# Patient Record
Sex: MALE | Race: WHITE | Employment: FULL TIME | URBAN - METROPOLITAN AREA
[De-identification: names, ages, dates, MRNs, and addresses within clinical notes are randomized per-mention and may not be internally consistent; named-entity substitution may affect disease eponyms.]

---

## 2018-07-18 ENCOUNTER — HOSPITAL ENCOUNTER (OUTPATIENT)
Dept: ULTRASOUND IMAGING | Age: 23
Discharge: HOME | End: 2018-07-18
Attending: FAMILY MEDICINE
Payer: SELF-PAY

## 2018-07-18 DIAGNOSIS — N13.70 VESICOURETERAL REFLUX: ICD-10-CM

## 2018-07-18 PROCEDURE — 76770 US EXAM ABDO BACK WALL COMP: CPT

## 2018-09-12 ENCOUNTER — OFFICE VISIT (OUTPATIENT)
Dept: SURGERY | Age: 23
End: 2018-09-12

## 2018-09-12 VITALS
HEIGHT: 71 IN | WEIGHT: 159 LBS | BODY MASS INDEX: 22.26 KG/M2 | TEMPERATURE: 97.6 F | DIASTOLIC BLOOD PRESSURE: 75 MMHG | HEART RATE: 66 BPM | OXYGEN SATURATION: 98 % | SYSTOLIC BLOOD PRESSURE: 115 MMHG

## 2018-09-12 DIAGNOSIS — R10.84 GENERALIZED ABDOMINAL PAIN: Primary | ICD-10-CM

## 2018-09-12 DIAGNOSIS — K40.90 LEFT INGUINAL HERNIA: ICD-10-CM

## 2018-09-12 DIAGNOSIS — R10.32 LEFT GROIN PAIN: ICD-10-CM

## 2018-09-12 NOTE — PROGRESS NOTES
Patient presents today for referral for hernia that has descended into the scrotum. 1. Have you been to the ER, urgent care clinic since your last visit? Hospitalized since your last visit? No    2. Have you seen or consulted any other health care providers outside of the [facility] since your last visit? Include any pap smears or colon screening.  No

## 2018-09-12 NOTE — PATIENT INSTRUCTIONS
If you have any questions or concerns about today's appointment, the verbal and/or written instructions you were given for follow up care, please call our office at [phone].     [facility] - [address]    [phone] office  [phone]

## 2018-09-17 NOTE — PROGRESS NOTES
General Surgery Consult      Viraj Partida  Admit date: (Not on file)    MRN: [id number]     : 1995     Age: 23 y.o. Attending Physician: Del Oshea MD, FACS      History of Present Illness:     Viraj Partida is a 23 y.o. male was referred or evaluation of a possible symptomatic left inguinal hernia. The patient is here today with his mother and his girlfriend . The patient has a significant past surgical history, including a laparotomy for severe urinary reflux disease at the age of 5years old. At it seems that the patient has been having recurrence of his symptoms recently with significant abdominal pain. He was told that he has reflux disease again after a radiological study. He has also been noticing swelling of his scrotum bilaterally. He had an ultrasound that showed bilateral hydrocele and a small left inguinal hernia. His abdominal pain is diffuse but mainly on the left side of the abdomen . He also has some left groin pain but mainly in the scrotum. He denies any nausea or vomiting. He denies any constipation. Patient Active Problem List    Diagnosis Date Noted    Epididymitis 2016     Past Medical History:   Diagnosis Date    Epididymitis     Scrotal pain       Past Surgical History:   Procedure Laterality Date    HX OTHER SURGICAL      kidney       Social History   Substance Use Topics    Smoking status: Never Smoker    Smokeless tobacco: Never Used    Alcohol use No      History   Smoking Status    Never Smoker   Smokeless Tobacco    Never Used     Family History   Problem Relation Age of Onset    Hypertension Mother     Hypertension Father       No current outpatient prescriptions on file. No current facility-administered medications for this visit.        No Known Allergies     Review of Systems:  Constitutional: negative  Eyes: negative  Ears, Nose, Mouth, Throat, and Face: negative  Respiratory: negative  Cardiovascular: negative  Gastrointestinal: positive for abdominal pain  Genitourinary:negative  Integument/Breast: negative  Hematologic/Lymphatic: negative  Musculoskeletal:negative  Neurological: negative  Behavioral/Psychiatric: negative  Endocrine: negative  Allergic/Immunologic: negative    Objective:     Visit Vitals    /75 (BP Patient Position: Sitting)    Pulse 66    Temp 97.6 °F (36.4 °C) (Oral)    Ht 5' 11\" (1.803 m)    Wt 72.1 kg (159 lb)    SpO2 98%    BMI 22.18 kg/m2       Physical Exam:      General:  in no apparent distress, alert, oriented times 3, anicteric and cooperative   Eyes:  conjunctivae and sclerae normal, pupils equal, round, reactive to light   Throat & Neck: no erythema or exudates noted and neck supple and symmetrical; no palpable masses   Lungs:   clear to auscultation bilaterally   Heart:  Regular rate and rhythm   Abdomen:   flat, soft, nontender, nondistended, no masses or organomegaly. There is no evidence of any anterior abdominal wall hernias. There is a lower transverse abdominal wall scar is well-healed. Right groin exam is relatively normal.  Left groin exam showed possible small left inguinal hernia though I could not feel the defect clearly.     Extremities: extremities normal, atraumatic, no cyanosis or edema   Skin: Normal.       Imaging and Lab Review:     CBC:   Lab Results   Component Value Date/Time    WBC 9.0 08/29/2018 08:23 PM    RBC 5.40 08/29/2018 08:23 PM    HGB 16.0 08/29/2018 08:23 PM    HCT 45.7 08/29/2018 08:23 PM    PLATELET 519 91/80/4163 08:23 PM     BMP:   Lab Results   Component Value Date/Time    Glucose 79 08/29/2018 08:23 PM    Sodium 139 08/29/2018 08:23 PM    Potassium 3.5 08/29/2018 08:23 PM    Chloride 102 08/29/2018 08:23 PM    CO2 27 08/29/2018 08:23 PM    BUN 13 08/29/2018 08:23 PM    Creatinine 1.4 (H) 08/29/2018 08:23 PM    Calcium 9.5 08/29/2018 08:23 PM     CMP:  Lab Results   Component Value Date/Time    Glucose 79 08/29/2018 08:23 PM    Sodium 139 08/29/2018 08:23 PM Potassium 3.5 08/29/2018 08:23 PM    Chloride 102 08/29/2018 08:23 PM    CO2 27 08/29/2018 08:23 PM    BUN 13 08/29/2018 08:23 PM    Creatinine 1.4 (H) 08/29/2018 08:23 PM    Calcium 9.5 08/29/2018 08:23 PM    Anion gap 10 08/29/2018 08:23 PM    Alk. phosphatase 81 08/29/2018 08:23 PM    Protein, total 8.3 (H) 08/29/2018 08:23 PM    Albumin 5.3 (H) 08/29/2018 08:23 PM       No results found for this or any previous visit (from the past 24 hour(s)). images and reports reviewed    Assessment:   Gwendolyn Ayoub is a 23 y.o. male is presenting with a picture of small left inguinal hernia seen on her ultrasound. I Discussed the possibility of incarceration, strangulation, enlargement in size over time, and the risk of emergency surgery in the face of strangulation. I explained to the the patient that his left inguinal hernia is not the main concern currently. I explained to the patient that he needs to see a urologist and to fix his reflux symptoms as well as his bilateral hydrocele. I do not believe that his left inguinal hernia is the reason for his diffuse abdominal pain. The patient and his mom agreed with this plan. Plan:     No need for any urgent surgery for his small left inguinal hernia. Urology consult for his severe recurrent reflux disease as well as his hydroceles  Follow-up with me as needed.     Please call me if you have any questions (cell phone: [phone])     Signed By: Jean Phoenix, MD     September 17, 2018

## 2019-01-07 ENCOUNTER — OFFICE VISIT (OUTPATIENT)
Dept: UROLOGY | Age: 24
End: 2019-01-07

## 2019-01-07 VITALS
WEIGHT: 164 LBS | DIASTOLIC BLOOD PRESSURE: 88 MMHG | OXYGEN SATURATION: 99 % | HEART RATE: 81 BPM | HEIGHT: 71 IN | SYSTOLIC BLOOD PRESSURE: 134 MMHG | BODY MASS INDEX: 22.96 KG/M2

## 2019-01-07 DIAGNOSIS — N41.9 PROSTATITIS, UNSPECIFIED PROSTATITIS TYPE: ICD-10-CM

## 2019-01-07 DIAGNOSIS — K40.90 INGUINAL HERNIA, RIGHT: ICD-10-CM

## 2019-01-07 DIAGNOSIS — N43.3 HYDROCELE, UNSPECIFIED HYDROCELE TYPE: Primary | ICD-10-CM

## 2019-01-07 LAB
BILIRUB UR QL STRIP: NEGATIVE
GLUCOSE UR-MCNC: NEGATIVE MG/DL
KETONES P FAST UR STRIP-MCNC: NEGATIVE MG/DL
PH UR STRIP: 7 [PH] (ref 4.6–8)
PROT UR QL STRIP: NORMAL
SP GR UR STRIP: 1.01 (ref 1–1.03)
UA UROBILINOGEN AMB POC: NORMAL (ref 0.2–1)
URINALYSIS CLARITY POC: CLEAR
URINALYSIS COLOR POC: YELLOW
URINE BLOOD POC: NORMAL
URINE LEUKOCYTES POC: NEGATIVE
URINE NITRITES POC: NEGATIVE

## 2019-01-07 NOTE — PATIENT INSTRUCTIONS
Prostatitis: Care Instructions Your Care Instructions The prostate gland is a small, walnut-shaped organ. It lies just below a man's bladder. It surrounds the urethra, the tube that carries urine through the penis and out of the body. Prostatitis is a painful condition caused by inflammation or infection of the prostate gland. Sometimes the condition is caused by bacteria, but often the cause is not known. Prostatitis caused by bacteria usually is treated with self-care and antibiotics. Follow-up care is a key part of your treatment and safety. Be sure to make and go to all appointments, and call your doctor if you are having problems. It's also a good idea to know your test results and keep a list of the medicines you take. How can you care for yourself at home? · If your doctor prescribed antibiotics, take them as directed. Do not stop taking them just because you feel better. You need to take the full course of antibiotics. · Take an over-the-counter pain medicine, such as acetaminophen (Tylenol), ibuprofen (Advil, Motrin), or naproxen (Aleve). Be safe with medicines. Read and follow all instructions on the label. · Take warm baths to help soothe pain. · Straining to pass stools can hurt when your prostate is inflamed. Avoid constipation. ? Include fruits, vegetables, beans, and whole grains in your diet each day. These foods are high in fiber. ? Drink plenty of fluids, enough so that your urine is light yellow or clear like water. If you have kidney, heart, or liver disease and have to limit fluids, talk with your doctor before you increase the amount of fluids you drink. ? Get some exercise every day. Build up slowly to 30 to 60 minutes a day on 5 or more days of the week. ? Take a fiber supplement, such as Citrucel or Metamucil, every day if needed. Read and follow all instructions on the label. ? Schedule time each day for a bowel movement.  Having a daily routine may help. Take your time and do not strain when having a bowel movement. · Avoid alcohol, caffeine, and spicy foods, especially if they make your symptoms worse. When should you call for help? Call your doctor now or seek immediate medical care if: 
  · You have symptoms of a urinary tract infection. These may include: 
? Pain or burning when you urinate. ? A frequent need to urinate without being able to pass much urine. ? Pain in the flank, which is just below the rib cage and above the waist on either side of the back. ? Blood in your urine. ? A fever.  
 Watch closely for changes in your health, and be sure to contact your doctor if: 
  · You cannot empty your bladder completely.  
  · You do not get better as expected. Where can you learn more? Go to http://nilda-darrell.info/. Enter C511 in the search box to learn more about \"Prostatitis: Care Instructions. \" Current as of: December 3, 2017 Content Version: 11.8 © 1285-6250 [employer]. Care instructions adapted under license by [employer] (which disclaims liability or warranty for this information). If you have questions about a medical condition or this instruction, always ask your healthcare professional. [place] [address] any warranty or liability for your use of this information.

## 2019-01-07 NOTE — PROGRESS NOTES
Mr. Maribel Hollis has a reminder for a \"due or due soon\" health maintenance. I have asked that he contact his primary care provider for follow-up on this health maintenance.

## 2019-01-08 NOTE — PROGRESS NOTES
Magdalena Both 26 y.o. male     Mr. Josh Vaughan seen today for evaluation of irritative voiding symptoms with mild dysuria since July 2018  Patient describes frequent urge to void during the daytime with hesitant intermittent weak urinary stream and mild dysuria symptoms are not persistent, not related to physical activity  No nocturia no hematuria no flank pain patient has decided to enlist in the [employer] waiver for history of what sounds like vesicoureteral reflux with ureteroneocystostomy at age 4-posterior urethral valves treated endoscopically is also a possibility -no records available for review  recently found to have small bilateral hydroceles and ultrasound imaging of the kidney showing no signs of obstruction on either side but increased parenchymal density suggesting renal parenchymal disease-creatinine 1.4  Epididymitis treated with antibiotics 2016-Dr. Yaz Whitten [facility]    Also has history of recent Inguinal hernia diagnosis        Scrotal US 2/1/16:  Impression:  Bilateral hydroceles. Left cyst at epididymal head (0.310 x 0.326 x 0.278cm). Normal right epididymis. Scrotal ultrasound 29 August 2018   FINDINGS:   Right testicle 4.5 x 2.6 x 3.5 cm. Right epididymal head 1.1 x 0.6 x 0.8 cm. Small right hydrocele. No intratesticular masses      Left testicle 4.5 x 2.2 x 2.9 cm per left epididymal head 0.8 x 0.1 cm. Small  left hydrocele and small left varicocele. Small amount of peristalsis in the  left scrotum suggesting a small left inguinal hernia. No intratesticular masses      Doppler evaluation demonstrate normal spectral waveform analysis of the  testicular arteries and veins bilaterally. IMPRESSION:   1. Small bilateral hydroceles and small left hydrocele. Small left scrotal  hernia.        Right kidney: 9.5 cm. Left kidney: 9.0 cm. Focal lesions: 0.6 x 0.5 x 0.6 cm interpolar right renal cyst.  Cortical thickness: Preserved.   Cortical echogenicity: Increased bilaterally. Mild right pelvocaliectasis. No obstructing lesion  seen.     Bladder: Slight bladder wall thickening without reticulation or diverticula. Prevoid bladder volume = 394 cc. Post void bladder volume = 22 cc. Post void bladder residual = 5%. Bilateral ureteric jets were present during the examination. Prostate measures 2.8 x 3.3 x 4.1 cm, with a calculated volume of 20 cc, within  normal limits.     IMPRESSION:   1. Bilateral echogenic kidneys in keeping with medical renal disease.     2. Mild right pelvocaliectasis without obstructing lesion seen.     3. Tiny right renal cyst. Limited visibility of the kidneys bilaterally/lower  pole, limiting sensitivity for detection of focal lesions.     Review of Systems:   CNS: No seizures syncope headaches dizziness or visual changes  Respiratory: No wheezing shortness of breath chest pain or coughing   cardiovascular: No angina no palpitations no angina no palpitations  Intestinal: No dyspepsia diarrhea or constipation  Urinary: Urgency urinary frequency diminished force of stream no hematuria no hematuria  Skeletal: No bone or joint pain endocrine:  Endocrine: No diabetes or thyroid disease  Other:    Allergies: No Known Allergies   Medications:      Past Medical History:   Diagnosis Date    Epididymitis     Scrotal pain       Past Surgical History:   Procedure Laterality Date    HX OTHER SURGICAL      kidney      Social History     Socioeconomic History    Marital status: SINGLE     Spouse name: Not on file    Number of children: Not on file    Years of education: Not on file    Highest education level: Not on file   Social Needs    Financial resource strain: Not on file    Food insecurity - worry: Not on file    Food insecurity - inability: Not on file   [employer] needs - medical: Not on file   [employer] needs - non-medical: Not on file   Occupational History    Occupation: student   Tobacco Use    Smoking status: Never Smoker    Smokeless tobacco: Never Used   Substance and Sexual Activity    Alcohol use: No     Alcohol/week: 0.0 oz    Drug use: No    Sexual activity: Yes     Birth control/protection: None   Other Topics Concern    Not on file   Social History Narrative    Not on file      Family History   Problem Relation Age of Onset    Hypertension Mother     Hypertension Father     Diabetes Maternal Grandmother         Physical Examination: Well-nourished mature male in no apparent distress    Abdomen is nontender no palpable masses no organomegaly  Back-no percussion CVA tenderness on either side  No inguinal hernia or adenopathy  Penis is normal  Testes are normal in size shape and consistency bilaterally-no epididymal induration or tenderness on either side  Spermatic cords are palpably normal bilaterally  Scrotum-  bilateral fluctuant nontender hydroceles by 6 x  x 8 x 8 cm  Prostate by FERMIN is rounded, smooth, benign in consistency and nontender-no induration no nodularity  No rectal masses tenderness or induration      Urinalysis: +pro negative heme/negative nitrite/     Impression: Small to moderate sized bilateral hydroceles                       Inguinal hernia                         Irritative and obstructive voiding symptoms with history of surgery at age 4 years for bilateral hydronephrosis-VUR or PUV        Plan: Cystoscopy    Herniorrhaphy and hydrocelectomy are indicated in order to qualify physically for [employer]-described / discussed        Jovany Wallace MD  -electronically signed-    PLEASE NOTE:  This document has been produced using voice recognition software. Unrecognized errors in transcription may be present.

## 2019-01-28 ENCOUNTER — OFFICE VISIT (OUTPATIENT)
Dept: UROLOGY | Age: 24
End: 2019-01-28

## 2019-01-28 VITALS
DIASTOLIC BLOOD PRESSURE: 85 MMHG | HEART RATE: 80 BPM | WEIGHT: 164 LBS | HEIGHT: 71 IN | SYSTOLIC BLOOD PRESSURE: 134 MMHG | OXYGEN SATURATION: 99 % | BODY MASS INDEX: 22.96 KG/M2

## 2019-01-28 DIAGNOSIS — K40.90 LEFT INGUINAL HERNIA: ICD-10-CM

## 2019-01-28 DIAGNOSIS — N43.3 HYDROCELE OF TUNICA VAGINALIS: ICD-10-CM

## 2019-01-28 DIAGNOSIS — R35.0 FREQUENT URINATION: Primary | ICD-10-CM

## 2019-01-28 RX ORDER — CIPROFLOXACIN 500 MG/1
500 TABLET ORAL 2 TIMES DAILY
Qty: 6 TAB | Refills: 0 | Status: SHIPPED | OUTPATIENT
Start: 2019-01-28 | End: 2019-01-31

## 2019-01-28 NOTE — PATIENT INSTRUCTIONS
Cystoscopy: Care Instructions  Your Care Instructions  Cystoscopy is a test. It uses a thin, lighted tube called a cystoscope to see the inside of the bladder and the urethra. The urethra is the tube that carries urine out of the body. This test is helpful because it lets your doctor see areas of your bladder and urethra that are hard to see on X-rays. It can help your doctor find bladder stones, tumors, bleeding, and infection. During this test, your doctor also can take tissue and urine samples. And if your doctor finds small stones or growths, he or she can remove them. In most cases the scope is in the bladder for less than 10 minutes. But the entire test may take 45 minutes or longer. You will probably get local anesthesia. This numbs a small part of your body. Or you may get spinal anesthesia, which numbs more of your body. Once in a while, doctors use general anesthesia. It makes you sleep during surgery. If you get a local anesthetic, you may be able to get up right after the test. But if you had spinal or general anesthesia, you will stay in the recovery room until you are able to walk or you have feeling again in your lower body. This usually takes about an hour. Your doctor may be able to tell you some of the results right after the test. But the complete results may take several days. Follow-up care is a key part of your treatment and safety. Be sure to make and go to all appointments, and call your doctor if you are having problems. It's also a good idea to know your test results and keep a list of the medicines you take. How can you care for yourself at home? Before the test  · If you are having a local anesthetic, you can eat and drink before the test.  · If you are having a spinal or general anesthetic, do not eat or drink anything for at least 8 hours before the test. Tell your doctor what medicines you take.   · If you are not staying overnight in the hospital, make sure you have someone who can drive you home after the test.  After the test  · If your doctor prescribed antibiotics, take them as directed. Do not stop taking them just because you feel better. You need to take the full course of antibiotics. · You may have some burning when you urinate for a day or two after the test. You may feel better if you drink more fluids. This may also help prevent an infection. · Your urine may have a pinkish color for a few days after the test.  When should you call for help? Call your doctor now or seek immediate medical care if:    · Your urine is still red or you see blood clots after you have urinated several times.     · You cannot pass urine 8 hours after the test.     · You get a fever or chills.     · You have pain in your belly or your back just below your rib cage. This is also called flank pain.    Watch closely for changes in your health, and be sure to contact your doctor if:    · You have pain or burning when you urinate. A burning sensation is normal for a day or two after the test. But call if it does not get better.     · You have a frequent urge to urinate but can pass only small amounts of urine.     · Your urine is pink, red, or cloudy or smells bad. It is normal for the urine to have a pinkish color for a few days after the test. But call if it does not get better.     · You do not get better as expected. Where can you learn more? Go to http://nilda-darrell.info/. Enter L629 in the search box to learn more about \"Cystoscopy: Care Instructions. \"  Current as of: March 20, 2018  Content Version: 11.9  © 9744-4841 [employer]. Care instructions adapted under license by [employer] (which disclaims liability or warranty for this information).  If you have questions about a medical condition or this instruction, always ask your healthcare professional. [address] any warranty or liability for your use of this information.

## 2019-01-28 NOTE — PROGRESS NOTES
Mr. Terry Lewis has a reminder for a \"due or due soon\" health maintenance. I have asked that he contact his primary care provider for follow-up on this health maintenance. [facility]  OFFICE PROCEDURE PROGRESS NOTE        Chart reviewed for the following:   Thomas BEAVERS LPN, have reviewed the History, Physical and updated the Allergic reactions for [address] performed immediately prior to start of procedure:   Vicki Kwong LPN, have performed the following reviews on Mackenzie Lan prior to the start of the procedure:            * Patient was identified by name and date of birth   * Agreement on procedure being performed was verified  * Risks and Benefits explained to the patient  * Procedure site verified and marked as necessary  * Patient was positioned for comfort  * Consent was signed and verified     Time: 15:35      Date of procedure: 1/28/2019    Procedure performed by:  Alison Walker MD    Provider assisted by: Rah Jaramillo LPN    Patient assisted by: [email or web address]    How tolerated by patient: tolerated the procedure well with no complications    Post Procedural Pain Scale: 0 - No Hurt    Comments: Patient verbalized understanding of procedure and post procedure instructions.

## 2019-01-29 LAB
BILIRUB UR QL STRIP: NEGATIVE
GLUCOSE UR-MCNC: NEGATIVE MG/DL
KETONES P FAST UR STRIP-MCNC: NEGATIVE MG/DL
PH UR STRIP: 6 [PH] (ref 4.6–8)
PROT UR QL STRIP: NORMAL
SP GR UR STRIP: 1.01 (ref 1–1.03)
UA UROBILINOGEN AMB POC: NORMAL (ref 0.2–1)
URINALYSIS CLARITY POC: CLEAR
URINALYSIS COLOR POC: YELLOW
URINE BLOOD POC: NORMAL
URINE LEUKOCYTES POC: NEGATIVE
URINE NITRITES POC: NEGATIVE

## 2019-01-29 NOTE — PROGRESS NOTES
Jeremy Prakashly 26 y.o. male     Mr. Alysa Salazar seen today for endoscopy assessing irritative voiding symptoms with history of bladder surgery at age 4 years which sounds like reimplantation of ureters for vesicoureteral reflux  Also has left inguinal hernia and bilateral hydroceles with desire to enlist in the [employer]    symptoms with mild dysuria since July 2018  Patient describes frequent urge to void during the daytime with hesitant intermittent weak urinary stream and mild dysuria symptoms are not persistent, not related to physical activity  No nocturia no hematuria no flank pain patient has decided to enlist in the [employer] for history of what sounds like vesicoureteral reflux with ureteroneocystostomy at age 4-posterior urethral valves treated endoscopically is also a possibility -no records available for review  recently found to have small bilateral hydroceles and ultrasound imaging of the kidney showing no signs of obstruction on either side but increased parenchymal density suggesting renal parenchymal disease-creatinine 1.4  Epididymitis treated with antibiotics 2016-Dr. Radha Li [facility]     Also has history of recent Inguinal hernia diagnosis           Scrotal US 2/1/16:  Impression:  Bilateral hydroceles. Left cyst at epididymal head (0.310 x 0.326 x 0.278cm). Normal right epididymis.     Scrotal ultrasound 29 August 2018   FINDINGS:   Right testicle 4.5 x 2.6 x 3.5 cm. Right epididymal head 1.1 x 0.6 x 0.8 cm. Small right hydrocele. No intratesticular masses      Left testicle 4.5 x 2.2 x 2.9 cm per left epididymal head 0.8 x 0.1 cm. Small  left hydrocele and small left varicocele. Small amount of peristalsis in the  left scrotum suggesting a small left inguinal hernia.  No intratesticular masses      Doppler evaluation demonstrate normal spectral waveform analysis of the  testicular arteries and veins bilaterally.     IMPRESSION:   1. Small bilateral hydroceles and small left hydrocele. Small left scrotal  hernia. Well-nourished mature male in no apparent distress   Right kidney: 9.5 cm. Left kidney: 9.0 cm. Focal lesions: 0.6 x 0.5 x 0.6 cm interpolar right renal cyst.  Cortical thickness: Preserved. Cortical echogenicity: Increased bilaterally. Mild right pelvocaliectasis. No obstructing lesion  seen.     Bladder: Slight bladder wall thickening without reticulation or diverticula. Prevoid bladder volume = 394 cc. Post void bladder volume = 22 cc. Post void bladder residual = 5%. Bilateral ureteric jets were present during the examination. Prostate measures 2.8 x 3.3 x 4.1 cm, with a calculated volume of 20 cc, within  normal limits.     IMPRESSION:   1. Bilateral echogenic kidneys in keeping with medical renal disease. 2. Mild right pelvocaliectasis without obstructing lesion seen. 3. Tiny right renal cyst. Limited visibility of the kidneys bilaterally/lower  pole, limiting sensitivity for detection of focal lesions.     Review of Systems:   CNS: No seizures syncope headaches dizziness or visual changes  Respiratory: No wheezing shortness of breath chest pain or coughing   cardiovascular: No angina no palpitations no angina no palpitations  Intestinal: No dyspepsia diarrhea or constipation  Urinary: Urgency urinary frequency diminished force of stream no hematuria no hematuria  Skeletal: No bone or joint pain endocrine:  Endocrine: No diabetes or thyroid disease  Other:         Allergies: No Known Allergies   Medications:    Current Outpatient Medications   Medication Sig Dispense Refill    ciprofloxacin HCl (CIPRO) 500 mg tablet Take 1 Tab by mouth two (2) times a day for 3 days.  6 Tab 0       Past Medical History:   Diagnosis Date    Epididymitis     Scrotal pain       Past Surgical History:   Procedure Laterality Date    HX OTHER SURGICAL      kidney      Social History     Socioeconomic History    Marital status: SINGLE     Spouse name: Not on file    Number of children: Not on file    Years of education: Not on file    Highest education level: Not on file   Social Needs    Financial resource strain: Not on file    Food insecurity - worry: Not on file    Food insecurity - inability: Not on file    Transportation needs - medical: Not on file   [employer] needs - non-medical: Not on file   Occupational History    Occupation: student   Tobacco Use    Smoking status: Never Smoker    Smokeless tobacco: Never Used   Substance and Sexual Activity    Alcohol use: No     Alcohol/week: 0.0 oz    Drug use: No    Sexual activity: Yes     Birth control/protection: None   Other Topics Concern    Not on file   Social History Narrative    Not on file      Family History   Problem Relation Age of Onset    Hypertension Mother     Hypertension Father     Diabetes Maternal Grandmother         Physical Examination:     Urinalysis: +pro dipstick/negative heme/negative nitrite     Cystoscopy Report                                                                       After prepping the glans penis with Betadine solution and instilling 2% lidocaine jelly intraurethrally a flexible cystoscope was passed through the urethra into the bladder revealing normal urothelium throughout. There are no strictures, stones, or tumors evident. No trabeculation or diverticuli-right ureteral orifice is slitlike located laterally on the right trigone. Left ureteral orifices in normal position but is markedly enlarged and horseshoe in its configuration-clear urine reflexes are observed from both sides.   The prostatic channel is unobstructed-there are no signs of posterior urethral valves      Impression: Normal cystoscopic findings                      Left inguinal hernia                      Bilateral hydroceles    Plan: Consult general surgery for evaluation and consideration of left inguinal herniorrhaphy/can perform bilateral hydro-colectomy under same anesthesia for herniorrhaphy    Cipro 500 mg twice daily times 3 days    rtc 6 weeks      More than 1/2 of this 15 minute visit was spent in counselling and coordination of care, as described above. Liat Voss MD  -electronically signed-    PLEASE NOTE:  This document has been produced using voice recognition software. Unrecognized errors in transcription may be present.

## 2019-02-04 ENCOUNTER — TELEPHONE (OUTPATIENT)
Dept: SURGERY | Age: 24
End: 2019-02-04

## 2019-02-04 NOTE — TELEPHONE ENCOUNTER
Unsuccessful attempt to contact  Josh Clarky to schedule an appointment for an evaluation of inguinal hernia per [facility] referral.

## 2019-03-01 ENCOUNTER — OFFICE VISIT (OUTPATIENT)
Dept: SURGERY | Age: 24
End: 2019-03-01

## 2019-03-01 VITALS
BODY MASS INDEX: 22.96 KG/M2 | TEMPERATURE: 97 F | SYSTOLIC BLOOD PRESSURE: 126 MMHG | HEIGHT: 71 IN | DIASTOLIC BLOOD PRESSURE: 88 MMHG | WEIGHT: 164 LBS | OXYGEN SATURATION: 98 % | HEART RATE: 70 BPM | RESPIRATION RATE: 16 BRPM

## 2019-03-01 DIAGNOSIS — R10.32 LEFT INGUINAL PAIN: Primary | ICD-10-CM

## 2019-03-01 NOTE — PROGRESS NOTES
Progress Note    Patient: Ajit Javed  MRN: [id number]  SSN: [id number]   YOB: 1995  Age: 26 y.o. Sex: male     Chief Complaint   Patient presents with    Inguinal Hernia     left inguinal hernia       HPI    Mr. Mia Villauferte is a 21-year-old gentleman with a fairly complicated past medical history. Apparently at the age of 4-year had some kind of exploratory laparotomy and reimplantation of some ureters for bad reflux. Over the last year or so he has had lower abdominal pain and in August of last year he was seen by Dr. Majo Chiang for a possible left inguinal hernia. At that time Dr. Clinton Blum did not feel a hernia on physical exam.  His diagnosis was based on a ultrasound reading that suggested the possibility of a hernia on the left. The ultrasound did not see a hernia on the left. Also he had bilateral hydroceles. These were very small and not visible on exam.  He returns here to the office today complaining of left groin pain. His history was discovered by chart review. He does describe a recent cystoscope by Dr. Kalpesh Yap. This cystoscope was a response to ongoing lower abdominal pain similar to the reflux he has had in the years past.  Apparently the scope was normal.  At some point in the past he was diagnosed with epididymitis, given a course of antibiotics. Today he presents with left groin pain.   His scrotum appears normal without hydrocele and I do not detect a hernia in his left canal.    Past Medical History:   Diagnosis Date    Epididymitis     Scrotal pain      Past Surgical History:   Procedure Laterality Date    HX OTHER SURGICAL      kidney      No Known Allergies    Social History     Socioeconomic History    Marital status: SINGLE     Spouse name: Not on file    Number of children: Not on file    Years of education: Not on file    Highest education level: Not on file   Social Needs    Financial resource strain: Not on file    Food insecurity - worry: Not on file   Nette-Ayala insecurity - inability: Not on file    Transportation needs - medical: Not on file   [employer] needs - non-medical: Not on file   Occupational History    Occupation: student   Tobacco Use    Smoking status: Never Smoker    Smokeless tobacco: Never Used   Substance and Sexual Activity    Alcohol use: No     Alcohol/week: 0.0 oz    Drug use: No    Sexual activity: Yes     Birth control/protection: None   Other Topics Concern    Not on file   Social History Narrative    Not on file     Family History   Problem Relation Age of Onset    Hypertension Mother     Hypertension Father     Diabetes Maternal Grandmother          Review of systems:  Patient denies any reflux, emesis, abdominal pain, change in bowel habits, hematochezia, melena, fever, weight loss, fatigue chills, dermatitis, abnormal moles, change in vision, vertigo, epistaxis, dysphagia, hoarseness, chest pain, palpitations, hypertension, edema, cough, shortness of breath, wheezing, hemoptysis, snoring, hematuria, diabetes, thyroid disease, anemia, bruising, history of blood transfusion, dizziness, headache, or fainting.     Physical Examination    Well developed well nourished male in no apparent distress  Visit Vitals  /88   Pulse 70   Temp 97 °F (36.1 °C) (Oral)   Resp 16   Ht 5' 11\" (1.803 m)   Wt 74.4 kg (164 lb)   SpO2 98%   BMI 22.87 kg/m²      Head: normocephalic, atraumatic  Mouth: Clear, no overt lesions, oral mucosa pink and moist  Neck: supple, no masses, no adenopathy or carotid bruits, trachea midline  Resp: clear to auscultation bilaterally, no wheeze, rhonchi or rales, excursions normal and symmetrical  Cardio: Regular rate and rhythm, no murmurs, clicks, gallops or rubs, no edema or varicosities  Abdomen: soft, nontender, nondistended, normoactive bowel sounds, no hernias, no hepatosplenomegaly,   Back: Deferred  Extremeties: warm, well-perfused, no tenderness or swelling, normal gait/station  Neuro: sensation and strength grossly intact and symmetrical  Psych: alert and oriented to person, place and time  Breast exam deferred    IMPRESSION  Patient with a complicated past medical history with a bladder exploration presenting with left groin pain and no demonstrable hernia or hydrocele. Old ultrasound.     PLAN  Orders Placed This Encounter    US SCROTUM/TESTICLES     Standing Status:   Future     Standing Expiration Date:   4/1/2020     Order Specific Question:   Reason for Exam     Answer:   ? hydrocele/hernia left     Repeat ultrasound and follow-up  Vicky Del Real MD

## 2019-03-05 ENCOUNTER — HOSPITAL ENCOUNTER (OUTPATIENT)
Dept: ULTRASOUND IMAGING | Age: 24
Discharge: HOME | End: 2019-03-05
Payer: MEDICAID

## 2019-03-05 DIAGNOSIS — R10.32 LEFT INGUINAL PAIN: ICD-10-CM

## 2019-03-05 PROCEDURE — 76870 US EXAM SCROTUM: CPT

## 2019-03-13 ENCOUNTER — OFFICE VISIT (OUTPATIENT)
Dept: SURGERY | Age: 24
End: 2019-03-13

## 2019-03-13 VITALS
SYSTOLIC BLOOD PRESSURE: 126 MMHG | BODY MASS INDEX: 22.96 KG/M2 | RESPIRATION RATE: 16 BRPM | HEIGHT: 71 IN | WEIGHT: 164 LBS | DIASTOLIC BLOOD PRESSURE: 88 MMHG | OXYGEN SATURATION: 98 % | HEART RATE: 78 BPM | TEMPERATURE: 98 F

## 2019-03-13 DIAGNOSIS — R10.32 LEFT INGUINAL PAIN: Primary | ICD-10-CM

## 2019-03-13 NOTE — PROGRESS NOTES
Progress Note    Patient: Lucero Hoffman  MRN: [id number]  SSN: [id number]   YOB: 1995  Age: 26 y.o. Sex: male     Chief Complaint   Patient presents with   Wilmer Roberts       HPI    Mr. Christopher Gandhi returns after a repeat ultrasound that shows minimal if any hydrocele and no hernia. I would reexamine him today. He is fit for full duty.   I suspect he has some epididymitis at the time he was examined previously    Past Medical History:   Diagnosis Date    Epididymitis     Scrotal pain      Past Surgical History:   Procedure Laterality Date    HX OTHER SURGICAL      kidney      No Known Allergies    Social History     Socioeconomic History    Marital status: SINGLE     Spouse name: Not on file    Number of children: Not on file    Years of education: Not on file    Highest education level: Not on file   Social Needs    Financial resource strain: Not on file    Food insecurity - worry: Not on file    Food insecurity - inability: Not on file    Transportation needs - medical: Not on file   [employer] needs - non-medical: Not on file   Occupational History    Occupation: student   Tobacco Use    Smoking status: Never Smoker    Smokeless tobacco: Never Used   Substance and Sexual Activity    Alcohol use: No     Alcohol/week: 0.0 oz    Drug use: No    Sexual activity: Yes     Birth control/protection: None   Other Topics Concern    Not on file   Social History Narrative    Not on file     Family History   Problem Relation Age of Onset    Hypertension Mother     Hypertension Father     Diabetes Maternal Grandmother          Review of systems:  Patient denies any reflux, emesis, abdominal pain, change in bowel habits, hematochezia, melena, fever, weight loss, fatigue chills, dermatitis, abnormal moles, change in vision, vertigo, epistaxis, dysphagia, hoarseness, chest pain, palpitations, hypertension, edema, cough, shortness of breath, wheezing, hemoptysis, snoring, hematuria, diabetes, thyroid disease, anemia, bruising, history of blood transfusion, dizziness, headache, or fainting. Physical Examination    Well developed well nourished male in no apparent distress  Visit Vitals  /88   Pulse 78   Temp 98 °F (36.7 °C) (Oral)   Resp 16   Ht 5' 11\" (1.803 m)   Wt 74.4 kg (164 lb)   SpO2 98%   BMI 22.87 kg/m²      Head: normocephalic, atraumatic  Mouth: Clear, no overt lesions, oral mucosa pink and moist  Neck: supple, no masses, no adenopathy or carotid bruits, trachea midline  Resp: clear to auscultation bilaterally, no wheeze, rhonchi or rales, excursions normal and symmetrical  Cardio: Regular rate and rhythm, no murmurs, clicks, gallops or rubs, no edema or varicosities  Abdomen: soft, nontender, nondistended, normoactive bowel sounds, no hernias, no hepatosplenomegaly,   Back:  deferred  Extremeties: warm, well-perfused, no tenderness or swelling, normal gait/station  Neuro: sensation and strength grossly intact and symmetrical  Psych: alert and oriented to person, place and time  Breast exam deferred    IMPRESSION  Probable history of epididymitis, no hernia no hydrocele    PLAN  No orders of the defined types were placed in this encounter.     Aloe up RENETTA Pinzon MD

## 2019-03-13 NOTE — PROGRESS NOTES
Chief Complaint   Patient presents with    Follow-up     US     1. Have you been to the ER, urgent care clinic since your last visit? Hospitalized since your last visit? No    2. Have you seen or consulted any other health care providers outside of the [address] [place] [address] since your last visit? Include any pap smears or colon screening.  No

## 2019-03-13 NOTE — LETTER
NOTIFICATION OF RETURN TO WORK / SCHOOL 
 
3/13/2019 10:32 AM 
 
Mr. Mackenzie Lan [place] [address] To Whom It May Concern: 
 
Mackenzie Lan was under the care of [facility]. He has been examined and his ultrasound has been reviewed. Pt does not have a hernia and is cleared for full duty If there are questions or concerns please have the patient contact our office.  
 
Sincerely, 
 
 
 
 
 
 
Markus Pickett MD

## 2019-04-15 ENCOUNTER — OFFICE VISIT (OUTPATIENT)
Dept: SURGERY | Age: 24
End: 2019-04-15

## 2019-04-15 VITALS
BODY MASS INDEX: 23.52 KG/M2 | DIASTOLIC BLOOD PRESSURE: 101 MMHG | RESPIRATION RATE: 16 BRPM | HEART RATE: 80 BPM | SYSTOLIC BLOOD PRESSURE: 154 MMHG | OXYGEN SATURATION: 99 % | TEMPERATURE: 97.8 F | HEIGHT: 71 IN | WEIGHT: 168 LBS

## 2019-04-15 DIAGNOSIS — N50.82 SCROTAL PAIN: ICD-10-CM

## 2019-04-15 DIAGNOSIS — R10.84 GENERALIZED ABDOMINAL PAIN: Primary | ICD-10-CM

## 2019-04-15 NOTE — PROGRESS NOTES
1. Have you been to the ER, urgent care clinic since your last visit? Hospitalized since your last visit? No    2. Have you seen or consulted any other health care providers outside of the [address] [place] [address] since your last visit? Include any pap smears or colon screening. Yes, PCP for prostitis     Patient presents for follow up with Dr. Jean Isaac for hernia and hydrocele.

## 2019-04-15 NOTE — PATIENT INSTRUCTIONS
If you have any questions or concerns about today's appointment, the verbal and/or written instructions you were given for follow up care, please call our office at [phone]. [facility] - [address]    [phone] office  [phone]

## 2019-04-17 NOTE — PROGRESS NOTES
General Surgery Consult    Anali Weller  Admit date: (Not on file)    MRN: [id number]     : 1995     Age: 26 y.o. Attending Physician: Andres Vasquez MD [facility]      History of Present Illness:      Anali Weller is a 26 y.o. male who I had seen last year for a possible left inguinal hernia. The patient has a very complicated medical and surgical history. When I saw him last year the patient has diffuse abdominal pain as well as bilateral groin and scrotal pain. At that point I did not believe that his left inguinal hernia that was seen on the ultrasound is the reason for his pain and I did not agree on proceeding with the surgery based on his history. As a stated above the patient has a significant past medical and surgical history, including a laparotomy for severe urinary reflux disease at the age of 5years old. It is seems that the patient has been having recurrence of his symptoms recently with significant abdominal pain. He was told that he has reflux disease again after a radiological study. He has also been noticing swelling of his scrotum bilaterally. He had an ultrasound that showed bilateral hydrocele and a small left inguinal hernia. His abdominal pain is diffuse, and he has bilateral scrotal swelling and pain. He denies any nausea or vomiting. He denies any constipation. He is here today with his mother and they are stating that his blood pressure has been increasing over the last few months, which I am not sure if it is related to his kidney issues. At the beginning of the conversation they stated that they are here to proceed with the hernia repair however I have stated to them that I said there is no indication currently based on his symptoms and that I did not believe that his symptoms are related to his very small inguinal hernia.   Then the patient and his mom stated that they are not sure what to do next and they are here to see if there is any help they can get.    Patient Active Problem List    Diagnosis Date Noted    Epididymitis 01/27/2016     Past Medical History:   Diagnosis Date    Epididymitis     Scrotal pain       Past Surgical History:   Procedure Laterality Date    HX OTHER SURGICAL      kidney       Social History     Tobacco Use    Smoking status: Never Smoker    Smokeless tobacco: Never Used   Substance Use Topics    Alcohol use: No     Alcohol/week: 0.0 oz      Social History     Tobacco Use   Smoking Status Never Smoker   Smokeless Tobacco Never Used     Family History   Problem Relation Age of Onset    Hypertension Mother     Hypertension Father     Diabetes Maternal Grandmother       No current outpatient medications on file. No current facility-administered medications for this visit. No Known Allergies     Review of Systems:  Constitutional: negative  Eyes: negative  Ears, Nose, Mouth, Throat, and Face: negative  Respiratory: negative  Cardiovascular: negative  Gastrointestinal: positive for abdominal pain and Bilateral groin and scrotal pain  Genitourinary:positive for Bilateral scrotal pain  Integument/Breast: negative  Hematologic/Lymphatic: negative  Musculoskeletal:negative  Neurological: negative  Behavioral/Psychiatric: negative  Endocrine: negative  Allergic/Immunologic: negative    Objective:     Visit Vitals  BP (!) 154/101 (BP 1 Location: Left arm, BP Patient Position: Sitting)   Pulse 80   Temp 97.8 °F (36.6 °C) (Oral)   Resp 16   Ht 5' 11\" (1.803 m)   Wt 76.2 kg (168 lb)   SpO2 99%   BMI 23.43 kg/m²       Physical Exam:      General:  in no apparent distress, alert and oriented times 3   Eyes:  conjunctivae and sclerae normal, pupils equal, round, reactive to light   Throat & Neck: no erythema or exudates noted and neck supple and symmetrical; no palpable masses   Lungs:   clear to auscultation bilaterally   Heart:  Regular rate and rhythm   Abdomen:   flat, soft, nontender, nondistended, no masses or organomegaly. There is no evidence of abdominal wall hernia. There is no clear evidence of an inguinal hernias despite the ultrasound showed a very small left inguinal hernia, but there is clear evidence of bilateral hydrocele which it seems to be noncommunicating though it is hard to tell . Extremities: extremities normal, atraumatic, no cyanosis or edema   Skin: Normal.       Imaging and Lab Review:     CBC:   Lab Results   Component Value Date/Time    WBC 9.0 08/29/2018 08:23 PM    RBC 5.40 08/29/2018 08:23 PM    HGB 16.0 08/29/2018 08:23 PM    HCT 45.7 08/29/2018 08:23 PM    PLATELET 358 07/48/8406 08:23 PM     BMP:   Lab Results   Component Value Date/Time    Glucose 79 08/29/2018 08:23 PM    Sodium 139 08/29/2018 08:23 PM    Potassium 3.5 08/29/2018 08:23 PM    Chloride 102 08/29/2018 08:23 PM    CO2 27 08/29/2018 08:23 PM    BUN 13 08/29/2018 08:23 PM    Creatinine 1.4 (H) 08/29/2018 08:23 PM    Calcium 9.5 08/29/2018 08:23 PM     CMP:  Lab Results   Component Value Date/Time    Glucose 79 08/29/2018 08:23 PM    Sodium 139 08/29/2018 08:23 PM    Potassium 3.5 08/29/2018 08:23 PM    Chloride 102 08/29/2018 08:23 PM    CO2 27 08/29/2018 08:23 PM    BUN 13 08/29/2018 08:23 PM    Creatinine 1.4 (H) 08/29/2018 08:23 PM    Calcium 9.5 08/29/2018 08:23 PM    Anion gap 10 08/29/2018 08:23 PM    Alk. phosphatase 81 08/29/2018 08:23 PM    Protein, total 8.3 (H) 08/29/2018 08:23 PM    Albumin 5.3 (H) 08/29/2018 08:23 PM       No results found for this or any previous visit (from the past 24 hour(s)). images and reports reviewed    Assessment:   Viraj Partida is a 26 y.o. male is presenting with generalized abdominal pain as well as groin and scrotal pain. I again explained to the patient and his mother that I do not believe his small left inguinal hernia is the reason for his symptoms.   I again insisted that I do not see any indication to proceed with a robotic left inguinal hernia repair because I did not believe this can relieve his symptoms. I stated to them to follow up with his PCP and the urologist especially that his blood pressure is increasing and this could be related to some kidney damage is from his reflux symptoms. But again I apologize for them that I will not be able to help them currently and that he can follow up with me if his hernia increases in size or becomes more obvious.      Plan:     No need for repair of his small left inguinal hernia because it is not the reason for his current symptoms  Follow-up with urology  Follow-up with his PCP  Follow-up with me as needed    Please call me if you have any questions (cell phone: [phone])     Signed By: Rama Yanez MD     April 17, 2019

## 2019-05-01 ENCOUNTER — OFFICE VISIT (OUTPATIENT)
Dept: UROLOGY | Age: 24
End: 2019-05-01

## 2019-05-01 VITALS
OXYGEN SATURATION: 99 % | WEIGHT: 166 LBS | HEART RATE: 102 BPM | DIASTOLIC BLOOD PRESSURE: 95 MMHG | SYSTOLIC BLOOD PRESSURE: 144 MMHG | HEIGHT: 71 IN | BODY MASS INDEX: 23.24 KG/M2

## 2019-05-01 DIAGNOSIS — N43.3 HYDROCELE, UNSPECIFIED HYDROCELE TYPE: ICD-10-CM

## 2019-05-01 DIAGNOSIS — K40.00 BILATERAL INGUINAL HERNIA WITH OBSTRUCTION AND WITHOUT GANGRENE, RECURRENCE NOT SPECIFIED: ICD-10-CM

## 2019-05-01 DIAGNOSIS — N13.30 HYDRONEPHROSIS, UNSPECIFIED HYDRONEPHROSIS TYPE: Primary | ICD-10-CM

## 2019-05-01 LAB
BILIRUB UR QL STRIP: NEGATIVE
GLUCOSE UR-MCNC: NEGATIVE MG/DL
KETONES P FAST UR STRIP-MCNC: NEGATIVE MG/DL
PH UR STRIP: 6.5 [PH] (ref 4.6–8)
PROT UR QL STRIP: NORMAL
SP GR UR STRIP: 1.01 (ref 1–1.03)
UA UROBILINOGEN AMB POC: NORMAL (ref 0.2–1)
URINALYSIS CLARITY POC: CLEAR
URINALYSIS COLOR POC: YELLOW
URINE BLOOD POC: NORMAL
URINE LEUKOCYTES POC: NEGATIVE
URINE NITRITES POC: NEGATIVE

## 2019-05-01 NOTE — PATIENT INSTRUCTIONS
Learning About Hydronephrosis  What is hydronephrosis? Hydronephrosis is swelling of the kidneys. It is caused by a buildup of urine. This condition can happen if a tube that drains urine from your kidneys is blocked. The blockage can come from within the urinary tract or from pressure outside of the tract. Pregnancy is an example of an outside (external) cause. This condition is often caused by a blockage such as a kidney stone, tumor, or blood clot. It also can be caused by a problem in your urinary system that you were born with (congenital problem). What are the symptoms? Some of the common symptoms are:  · Pain in one or both sides. · Stomach pain. · Blood in your urine. Some people have no symptoms. How is it diagnosed? Your doctor will do an ultrasound to look for a blockage in your urinary system. An ultrasound allows your doctor to see a picture of the organs and other structures in your belly (abdomen). You also may need blood and urine tests. How is it treated? Your treatment depends on the cause of the swelling. If it is caused by a blockage, your treatment will depend on the type of blockage you have. If the blockage is caused by a kidney stone, you may wait for the stone to pass. If hydronephrosis happens during pregnancy, it usually clears up on its own. You may need to have urine drained from your bladder or kidneys. A urinary catheter is a small, flexible tube that can be inserted through the urethra and into the bladder, allowing urine to drain. A nephrostomy catheter is a thin tube placed into your kidney to drain urine. Sometimes surgery is needed to clear the blockage. If you have a blockage, you should begin to feel better after the blockage is gone. Many people recover and have no long-term problems. But some may have kidney damage. If hydronephrosis was left untreated for a long time, the damage can be severe. Severe damage will require further treatment.   Follow-up care is a key part of your treatment and safety. Be sure to make and go to all appointments, and call your doctor if you are having problems. It's also a good idea to know your test results and keep a list of the medicines you take. Where can you learn more? Go to http://nilda-darrell.info/. Enter S386 in the search box to learn more about \"Learning About Hydronephrosis. \"  Current as of: March 14, 2018  Content Version: 11.9  © 6883-7004 [employer]. Care instructions adapted under license by [employer] (which disclaims liability or warranty for this information). If you have questions about a medical condition or this instruction, always ask your healthcare professional. [address] any warranty or liability for your use of this information.

## 2019-05-01 NOTE — PROGRESS NOTES
Mr. Brant Colin has a reminder for a \"due or due soon\" health maintenance. I have asked that he contact his primary care provider for follow-up on this health maintenance.

## 2019-05-02 NOTE — PROGRESS NOTES
Wilmer Russ 26 y.o. male     Mr. Nabeel Fortune seen today for follow-up hydrocele-hernia    Exam by general surgery negative for overt hernia/positive for communicating hydrocele    irritative voiding symptoms with history of bladder surgery at age 4 years which sounds like reimplantation of ureters for vesicoureteral reflux  Also has left inguinal hernia and bilateral hydroceles with desire to enlist in the [employer]     symptoms with mild dysuria since July 2018  Patient describes frequent urge to void during the daytime with hesitant intermittent weak urinary stream and mild dysuria symptoms are not persistent, not related to physical activity  No nocturia no hematuria no flank pain patient has decided to enlist in the [employer] for history of what sounds like vesicoureteral reflux with ureteroneocystostomy at age 4-posterior urethral valves treated endoscopically is also a possibility -no records available for review  recently found to have small bilateral hydroceles and ultrasound imaging of the kidney showing no signs of obstruction on either side but increased parenchymal density suggesting renal parenchymal disease-creatinine 1.4  Epididymitis treated with antibiotics 2016-Dr. Marly Harrison [facility]     Also has history of recent Inguinal hernia diagnosis           Scrotal US 2/1/16:  Impression:  Bilateral hydroceles. Left cyst at epididymal head (0.310 x 0.326 x 0.278cm). Normal right epididymis.     Scrotal ultrasound 29 August 2018   FINDINGS:   Right testicle 4.5 x 2.6 x 3.5 cm. Right epididymal head 1.1 x 0.6 x 0.8 cm. Small right hydrocele. No intratesticular masses      Left testicle 4.5 x 2.2 x 2.9 cm per left epididymal head 0.8 x 0.1 cm. Small  left hydrocele and small left varicocele. Small amount of peristalsis in the  left scrotum suggesting a small left inguinal hernia.  No intratesticular masses      Doppler evaluation demonstrate normal spectral waveform analysis of the  testicular arteries and veins bilaterally.     IMPRESSION:   1. Small bilateral hydroceles and small left hydrocele. Small left scrotal  hernia. Well-nourished mature male in no apparent distress   Right kidney: 9.5 cm. Left kidney: 9.0 cm. Focal lesions: 0.6 x 0.5 x 0.6 cm interpolar right renal cyst.  Cortical thickness: Preserved. Cortical echogenicity: Increased bilaterally. Mild right pelvocaliectasis. No obstructing lesion  seen.     Bladder: Slight bladder wall thickening without reticulation or diverticula. Prevoid bladder volume = 394 cc. Post void bladder volume = 22 cc. Post void bladder residual = 5%. Bilateral ureteric jets were present during the examination. Prostate measures 2.8 x 3.3 x 4.1 cm, with a calculated volume of 20 cc, within  normal limits.     IMPRESSION:   1. Bilateral echogenic kidneys in keeping with medical renal disease. 2. Mild right pelvocaliectasis without obstructing lesion seen.   3. Tiny right renal cyst. Limited visibility of the kidneys bilaterally/lower  pole, limiting sensitivity for detection of focal lesions.     Review of Systems:   CNS: No seizures syncope headaches dizziness or visual changes  Respiratory: No wheezing shortness of breath chest pain or coughing   cardiovascular: No angina no palpitations no angina no palpitations  Intestinal: No dyspepsia diarrhea or constipation  Urinary: Urgency urinary frequency diminished force of stream no hematuria no hematuria  Skeletal: No bone or joint pain endocrine:  Endocrine: No diabetes or thyroid disease  Other:                Allergies: No Known Allergies   Medications:      Past Medical History:   Diagnosis Date    Epididymitis     Scrotal pain       Past Surgical History:   Procedure Laterality Date    HX OTHER SURGICAL      kidney      Social History     Socioeconomic History    Marital status: SINGLE     Spouse name: Not on file    Number of children: Not on file    Years of education: Not on file    Highest education level: Not on file   Occupational History    Occupation: student   Social Needs    Financial resource strain: Not on file    Food insecurity:     Worry: Not on file     Inability: Not on file    Transportation needs:     Medical: Not on file     Non-medical: Not on file   Tobacco Use    Smoking status: Never Smoker    Smokeless tobacco: Never Used   Substance and Sexual Activity    Alcohol use: No     Alcohol/week: 0.0 oz    Drug use: No    Sexual activity: Yes     Birth control/protection: None   Lifestyle    Physical activity:     Days per week: Not on file     Minutes per session: Not on file    Stress: Not on file   Relationships    Social connections:     Talks on phone: Not on file     Gets together: Not on file     Attends Lutheran service: Not on file     Active member of club or organization: Not on file     Attends meetings of clubs or organizations: Not on file     Relationship status: Not on file    Intimate partner violence:     Fear of current or ex partner: Not on file     Emotionally abused: Not on file     Physically abused: Not on file     Forced sexual activity: Not on file   Other Topics Concern    Not on file   Social History Narrative    Not on file      Family History   Problem Relation Age of Onset    Hypertension Mother     Hypertension Father     Diabetes Maternal Grandmother         Physical Examination: Well-nourished trim and fit appearing adult in no apparent distress    Bilateral fluctuant nontender hydroceles    Urinalysis: Negative dipstick/nitrite negative/++pro        Impression: Bilateral communicating hydroceles                        History of pediatric bladder lesion corrected surgically      Plan: CT urogram assessing kidneys ureters and bladder    Hydrocelectomies inguinal incisions are indicated-we will schedule with general surgery anticipating need for hernia correction procedure in association with hydrocelectomy    Patient advised that he is not likely to pass physical exam standards for service in the [employer]    RTC 2 weeks    More than 1/2 of this 25 minute visit was spent in counselling and coordination of care, as described above. Brayan Munoz MD  -electronically signed-    PLEASE NOTE:  This document has been produced using voice recognition software. Unrecognized errors in transcription may be present.

## 2019-05-07 ENCOUNTER — DOCUMENTATION ONLY (OUTPATIENT)
Dept: UROLOGY | Age: 24
End: 2019-05-07

## 2019-05-07 NOTE — PROGRESS NOTES
Faxed records to [facility] @ [facility] for PCP review. Fax # [phone].

## 2019-06-03 ENCOUNTER — HOSPITAL ENCOUNTER (OUTPATIENT)
Dept: CT IMAGING | Age: 24
Discharge: HOME | End: 2019-06-03
Attending: UROLOGY
Payer: MEDICAID

## 2019-06-03 DIAGNOSIS — N13.30 HYDRONEPHROSIS, UNSPECIFIED HYDRONEPHROSIS TYPE: ICD-10-CM

## 2019-06-03 PROCEDURE — 74011636320 HC RX REV CODE- 636/320: Performed by: UROLOGY

## 2019-06-03 PROCEDURE — 74178 CT ABD&PLV WO CNTR FLWD CNTR: CPT

## 2019-06-03 RX ADMIN — IOPAMIDOL 100 ML: 755 INJECTION, SOLUTION INTRAVENOUS at 16:18

## 2019-06-13 ENCOUNTER — OFFICE VISIT (OUTPATIENT)
Dept: UROLOGY | Age: 24
End: 2019-06-13

## 2019-06-13 VITALS
HEART RATE: 68 BPM | SYSTOLIC BLOOD PRESSURE: 129 MMHG | BODY MASS INDEX: 23.66 KG/M2 | OXYGEN SATURATION: 100 % | HEIGHT: 71 IN | DIASTOLIC BLOOD PRESSURE: 93 MMHG | WEIGHT: 169 LBS

## 2019-06-13 DIAGNOSIS — N43.3 HYDROCELE, UNSPECIFIED HYDROCELE TYPE: ICD-10-CM

## 2019-06-13 DIAGNOSIS — N13.30 HYDRONEPHROSIS, UNSPECIFIED HYDRONEPHROSIS TYPE: Primary | ICD-10-CM

## 2019-06-13 DIAGNOSIS — Z01.818 PRE-OP TESTING: ICD-10-CM

## 2019-06-13 DIAGNOSIS — N13.30 HYDRONEPHROSIS, UNSPECIFIED HYDRONEPHROSIS TYPE: ICD-10-CM

## 2019-06-13 DIAGNOSIS — K40.00 BILATERAL INGUINAL HERNIA WITH OBSTRUCTION AND WITHOUT GANGRENE, RECURRENCE NOT SPECIFIED: ICD-10-CM

## 2019-06-13 LAB
BILIRUB UR QL STRIP: NEGATIVE
GLUCOSE UR-MCNC: NEGATIVE MG/DL
KETONES P FAST UR STRIP-MCNC: NEGATIVE MG/DL
PH UR STRIP: 6 [PH] (ref 4.6–8)
PROT UR QL STRIP: NORMAL
SP GR UR STRIP: 1.01 (ref 1–1.03)
UA UROBILINOGEN AMB POC: NORMAL (ref 0.2–1)
URINALYSIS CLARITY POC: CLEAR
URINALYSIS COLOR POC: YELLOW
URINE BLOOD POC: NEGATIVE
URINE LEUKOCYTES POC: NEGATIVE
URINE NITRITES POC: NEGATIVE

## 2019-06-13 NOTE — PROGRESS NOTES
Mr. Mar Duron has a reminder for a \"due or due soon\" health maintenance. I have asked that he contact his primary care provider for follow-up on this health maintenance.

## 2019-06-13 NOTE — PATIENT INSTRUCTIONS
Male Reproductive Organs: Anatomy Sketch    Current as of: September 26, 2018  Content Version: 11.9  © 1413-5147 [employer]. Care instructions adapted under license by [employer] (which disclaims liability or warranty for this information). If you have questions about a medical condition or this instruction, always ask your healthcare professional. [place] [address] any warranty or liability for your use of this information.

## 2019-06-14 DIAGNOSIS — K40.00 BILATERAL INGUINAL HERNIA WITH OBSTRUCTION AND WITHOUT GANGRENE, RECURRENCE NOT SPECIFIED: ICD-10-CM

## 2019-06-14 DIAGNOSIS — Z01.818 PRE-OP TESTING: ICD-10-CM

## 2019-06-14 DIAGNOSIS — N43.3 HYDROCELE, UNSPECIFIED HYDROCELE TYPE: ICD-10-CM

## 2019-06-14 DIAGNOSIS — N13.30 HYDRONEPHROSIS, UNSPECIFIED HYDRONEPHROSIS TYPE: ICD-10-CM

## 2019-06-14 NOTE — PROGRESS NOTES
Radhika Freeze 26 y.o. male     Mr. Edwige Recio seen today for follow-up communicating hydroceles/inguinal hernias-here today for CT scan imaging results and scheduling of surgical repair      irritative voiding symptoms with history of bladder surgery at age 4 years which sounds like reimplantation of ureters for vesicoureteral reflux  Also has left inguinal hernia and bilateral hydroceles with desire to enlist in the [employer]     symptoms with mild dysuria since July 2018  Patient describes frequent urge to void during the daytime with hesitant intermittent weak urinary stream and mild dysuria symptoms are not persistent, not related to physical activity  No nocturia no hematuria no flank pain patient has decided to enlist in the [employer] for history of what sounds like vesicoureteral reflux with ureteroneocystostomy at age 4-posterior urethral valves treated endoscopically is also a possibility -no records available for review  recently found to have small bilateral hydroceles and ultrasound imaging of the kidney showing no signs of obstruction on either side but increased parenchymal density suggesting renal parenchymal disease-creatinine 1.4  Epididymitis treated with antibiotics 2016-Dr. Agnes Nicholson [facility]     Also has history of recent Inguinal hernia diagnosis       Scrotal ultrasound 29 August 2018   FINDINGS:   Right testicle 4.5 x 2.6 x 3.5 cm. Right epididymal head 1.1 x 0.6 x 0.8 cm. Small right hydrocele. No intratesticular masses      Left testicle 4.5 x 2.2 x 2.9 cm per left epididymal head 0.8 x 0.1 cm. Small  left hydrocele and small left varicocele. Small amount of peristalsis in the  left scrotum suggesting a small left inguinal hernia.  No intratesticular masses      Doppler evaluation demonstrate normal spectral waveform analysis of the  testicular arteries and veins bilaterally.     IMPRESSION:   1. Small bilateral hydroceles and small left hydrocele. Small left scrotal  hernia.   Well-nourished mature male in no apparent distress   Right kidney: 9.5 cm. Left kidney: 9.0 cm. Focal lesions: 0.6 x 0.5 x 0.6 cm interpolar right renal cyst.  Cortical thickness: Preserved. Cortical echogenicity: Increased bilaterally. Mild right pelvocaliectasis. No obstructing lesion  seen.     Bladder: Slight bladder wall thickening without reticulation or diverticula. Prevoid bladder volume = 394 cc. Post void bladder volume = 22 cc. Post void bladder residual = 5%. Bilateral ureteric jets were present during the examination. Prostate measures 2.8 x 3.3 x 4.1 cm, with a calculated volume of 20 cc, within  normal limits.     IMPRESSION:   1. Bilateral echogenic kidneys in keeping with medical renal disease. 2. Mild right pelvocaliectasis without obstructing lesion seen. 3. Tiny right renal cyst. Limited visibility of the kidneys bilaterally/lower  pole, limiting sensitivity for detection of focal lesions.     Review of Systems:   CNS: No seizures syncope headaches dizziness or visual changes  Respiratory: No wheezing shortness of breath chest pain or coughing   cardiovascular: No angina no palpitations no angina no palpitations  Intestinal: No dyspepsia diarrhea or constipation  Urinary: Urgency urinary frequency diminished force of stream no hematuria no hematuria  Skeletal: No bone or joint pain endocrine:  Endocrine: No diabetes or thyroid disease  Other:     CT scan imaging of the abdomen and pelvis on 3 Sosa 2019:  IMPRESSION:  1. Nonspecific bilateral ureteral dilation with renal pelviectasis. No  definite obstructing mass or stone. Vesicoureteral reflux vs. ureteral  reimplantation related chronic change as the consideration for underlying reason  for the ureteral dilation. 2.  No renal mass or stone.   3.  Asymmetry in the size of the kidneys, with the left kidney being much  smaller than the right.   4.  Bilateral inguinal hernia containing fatty tissue only. No incarceration of  small bowel or colon. Only the right hydrocele is partially visualized on  current scan. 5.  Incidental nonspecific thickening of the appendix tip. Perhaps secondary to  retention of internal material.  Attention on followup.       Creatinine 1.4 in August 2018       Allergies: No Known Allergies   Medications:      Past Medical History:   Diagnosis Date    Epididymitis     Scrotal pain       Past Surgical History:   Procedure Laterality Date    HX OTHER SURGICAL      kidney      Social History     Socioeconomic History    Marital status: SINGLE     Spouse name: Not on file    Number of children: Not on file    Years of education: Not on file    Highest education level: Not on file   Occupational History    Occupation: student   Social Needs    Financial resource strain: Not on file    Food insecurity:     Worry: Not on file     Inability: Not on file    Transportation needs:     Medical: Not on file     Non-medical: Not on file   Tobacco Use    Smoking status: Never Smoker    Smokeless tobacco: Never Used   Substance and Sexual Activity    Alcohol use: No     Alcohol/week: 0.0 oz    Drug use: No    Sexual activity: Yes     Birth control/protection: None   Lifestyle    Physical activity:     Days per week: Not on file     Minutes per session: Not on file    Stress: Not on file   Relationships    Social connections:     Talks on phone: Not on file     Gets together: Not on file     Attends Hinduism service: Not on file     Active member of club or organization: Not on file     Attends meetings of clubs or organizations: Not on file     Relationship status: Not on file    Intimate partner violence:     Fear of current or ex partner: Not on file     Emotionally abused: Not on file     Physically abused: Not on file     Forced sexual activity: Not on file   Other Topics Concern    Not on file   Social History Narrative    Not on file      Family History Problem Relation Age of Onset    Hypertension Mother     Hypertension Father     Diabetes Maternal Grandmother         Physical Examination: Well-nourished mature male in no apparent distress  Neck: No masses no nodes no bruits  Lungs: Clear breath sounds bilaterally no wheezes no rales no rhonchi  Heart: Regular sinus rhythm no murmurs no gallops no rubs  Abdomen: Nontender no palpable masses  External genitalia-penis is normal-bilateral fluctuant nontender hydroceles  Back: No percussion CVA tenderness  Skin: Warm and dry no rash no lesions  Neurologic: No motor or sensory deficits in arms or legs      Urinalysis: +pro/negative nitrite negative heme    Impression: Bilateral hydrocele-hernia                        History of bilateral vesicoureteral reflux with atrophy of left kidney                        Hypertension with mild renal insufficiency-creatinine 1.4        Plan: Bilateral inguinal hydrocelectomy hernia repair at [facility]    Counseling Note:  Technique of bilateral inguinal hydrocelectomy with hernia repair them and described discussed with patient who understands accepts the risk of bleeding, infection, as well as possible recurrence of hernias-small risk of compromised fertility from inadvertent compromise of the vas deferens bilaterally      More than 1/2 of this 40 minute visit was spent in counselling and coordination of care, as described above. Rae Jackson MD  -electronically signed-    PLEASE NOTE:  This document has been produced using voice recognition software. Unrecognized errors in transcription may be present.

## 2019-06-21 ENCOUNTER — HOSPITAL ENCOUNTER (OUTPATIENT)
Dept: LAB | Age: 24
Discharge: HOME | End: 2019-06-21

## 2019-06-21 LAB — SENTARA SPECIMEN COL,SENBCF: NORMAL

## 2019-06-21 PROCEDURE — 99001 SPECIMEN HANDLING PT-LAB: CPT

## 2019-06-22 LAB
ANION GAP SERPL CALC-SCNC: 18 MMOL/L
BUN SERPL-MCNC: 12 MG/DL (ref 6–22)
CALCIUM SERPL-MCNC: 9.9 MG/DL (ref 8.4–10.4)
CHLORIDE SERPL-SCNC: 105 MMOL/L (ref 98–110)
CO2 SERPL-SCNC: 23 MMOL/L (ref 20–32)
CREAT SERPL-MCNC: 1.3 MG/DL (ref 0.5–1.2)
ERYTHROCYTE [DISTWIDTH] IN BLOOD BY AUTOMATED COUNT: 12.7 % (ref 10–15.5)
GFRAA, 66117: >60
GFRNA, 66118: >60
GLUCOSE SERPL-MCNC: 92 MG/DL (ref 70–99)
HCT VFR BLD AUTO: 45.9 % (ref 36.6–51.9)
HGB BLD-MCNC: 15.5 G/DL (ref 13.2–17.3)
MCH RBC QN AUTO: 30 PG (ref 26–34)
MCHC RBC AUTO-ENTMCNC: 34 G/DL (ref 31–36)
MCV RBC AUTO: 88 FL (ref 80–95)
PLATELET # BLD AUTO: 261 K/UL (ref 140–440)
PMV BLD AUTO: 10.7 FL (ref 9–13)
POTASSIUM SERPL-SCNC: 4.3 MMOL/L (ref 3.5–5.5)
RBC # BLD AUTO: 5.23 M/UL (ref 3.8–5.8)
SODIUM SERPL-SCNC: 146 MMOL/L (ref 133–145)
WBC # BLD AUTO: 5.9 K/UL (ref 4–11)

## 2019-06-25 RX ORDER — SODIUM CHLORIDE 9 MG/ML
100 INJECTION, SOLUTION INTRAVENOUS CONTINUOUS
Status: CANCELLED | OUTPATIENT
Start: 2019-06-25

## 2019-06-26 ENCOUNTER — OFFICE VISIT (OUTPATIENT)
Dept: SURGERY | Age: 24
End: 2019-06-26

## 2019-06-26 VITALS
WEIGHT: 168 LBS | RESPIRATION RATE: 18 BRPM | OXYGEN SATURATION: 100 % | BODY MASS INDEX: 24.05 KG/M2 | TEMPERATURE: 98.2 F | DIASTOLIC BLOOD PRESSURE: 87 MMHG | HEART RATE: 68 BPM | SYSTOLIC BLOOD PRESSURE: 143 MMHG | HEIGHT: 70 IN

## 2019-06-26 DIAGNOSIS — N43.3 HYDROCELE, UNSPECIFIED HYDROCELE TYPE: ICD-10-CM

## 2019-06-26 DIAGNOSIS — N50.82 SCROTAL PAIN: Primary | ICD-10-CM

## 2019-06-26 NOTE — PROGRESS NOTES
Progress Note    Patient: John Baez  MRN: [id number]  SSN: [id number]   YOB: 1995  Age: 26 y.o. Sex: male     Chief Complaint   Patient presents with    Pre-op Exam     left inguinal hernia. HPI    Ms. Angelica Song is a 82-year-old gentleman with history of bilateral hydroceles. There is also suggestion that he had a may have small bilateral inguinal hernias. His hydroceles apparently connect and vary in size dramatically at different times. I been asked by Dr. Jennyfer Valdez to assist in surgery for him and repair any hernias that we might see. I discussed this with Mr. Jennifer Landry and he is anxious to proceed.     Past Medical History:   Diagnosis Date    Epididymitis     Scrotal pain      Past Surgical History:   Procedure Laterality Date    HX OTHER SURGICAL      kidney      No Known Allergies    Social History     Socioeconomic History    Marital status: SINGLE     Spouse name: Not on file    Number of children: Not on file    Years of education: Not on file    Highest education level: Not on file   Occupational History    Occupation: student   Social Needs    Financial resource strain: Not on file    Food insecurity:     Worry: Not on file     Inability: Not on file    Transportation needs:     Medical: Not on file     Non-medical: Not on file   Tobacco Use    Smoking status: Never Smoker    Smokeless tobacco: Never Used   Substance and Sexual Activity    Alcohol use: No     Alcohol/week: 0.0 oz    Drug use: No    Sexual activity: Yes     Birth control/protection: None   Lifestyle    Physical activity:     Days per week: Not on file     Minutes per session: Not on file    Stress: Not on file   Relationships    Social connections:     Talks on phone: Not on file     Gets together: Not on file     Attends Zoroastrian service: Not on file     Active member of club or organization: Not on file     Attends meetings of clubs or organizations: Not on file     Relationship status: Not on file    Intimate partner violence:     Fear of current or ex partner: Not on file     Emotionally abused: Not on file     Physically abused: Not on file     Forced sexual activity: Not on file   Other Topics Concern    Not on file   Social History Narrative    Not on file     Family History   Problem Relation Age of Onset    Hypertension Mother     Hypertension Father     Diabetes Maternal Grandmother          Review of systems:  Patient denies any reflux, emesis, abdominal pain, change in bowel habits, hematochezia, melena, fever, weight loss, fatigue chills, dermatitis, abnormal moles, change in vision, vertigo, epistaxis, dysphagia, hoarseness, chest pain, palpitations, hypertension, edema, cough, shortness of breath, wheezing, hemoptysis, snoring, hematuria, diabetes, thyroid disease, anemia, bruising, history of blood transfusion, dizziness, headache, or fainting.     Physical Examination    Well developed well nourished male in no apparent distress  Visit Vitals  /87   Pulse 68   Temp 98.2 °F (36.8 °C) (Oral)   Resp 18   Ht 5' 10\" (1.778 m)   Wt 76.2 kg (168 lb)   SpO2 100%   BMI 24.11 kg/m²      Head: normocephalic, atraumatic  Mouth: Clear, no overt lesions, oral mucosa pink and moist  Neck: supple, no masses, no adenopathy or carotid bruits, trachea midline  Resp: clear to auscultation bilaterally, no wheeze, rhonchi or rales, excursions normal and symmetrical  Cardio: Regular rate and rhythm, no murmurs, clicks, gallops or rubs, no edema or varicosities  Abdomen: soft, nontender, nondistended, normoactive bowel sounds, no hernias palpable, no hepatosplenomegaly,   Back: Deferred  Extremeties: warm, well-perfused, no tenderness or swelling, normal gait/station  Neuro: sensation and strength grossly intact and symmetrical  Psych: alert and oriented to person, place and time  Breast exam deferred    IMPRESSION  Hydrocele with possible hernias    PLAN  No orders of the defined types were placed in this encounter.     Repair hernia as necessary during case with urology  Khushi Cuevas MD

## 2019-06-26 NOTE — PROGRESS NOTES
Chief Complaint   Patient presents with    Pre-op Exam     left inguinal hernia. Pt states has bilateral inguinal hernia. C/o constipation and when he tries to go it feels like his rectum and urethral tubes are being set on fire.

## 2019-06-27 ENCOUNTER — ANESTHESIA EVENT (OUTPATIENT)
Dept: SURGERY | Age: 24
End: 2019-06-27
Payer: MEDICAID

## 2019-06-28 ENCOUNTER — ANESTHESIA (OUTPATIENT)
Dept: SURGERY | Age: 24
End: 2019-06-28
Payer: MEDICAID

## 2019-06-28 ENCOUNTER — HOSPITAL ENCOUNTER (OUTPATIENT)
Age: 24
Setting detail: OUTPATIENT SURGERY
Discharge: HOME | End: 2019-06-28
Attending: UROLOGY | Admitting: UROLOGY
Payer: MEDICAID

## 2019-06-28 VITALS
RESPIRATION RATE: 18 BRPM | DIASTOLIC BLOOD PRESSURE: 66 MMHG | OXYGEN SATURATION: 100 % | HEIGHT: 70 IN | SYSTOLIC BLOOD PRESSURE: 120 MMHG | WEIGHT: 195.25 LBS | HEART RATE: 79 BPM | TEMPERATURE: 98.2 F | BODY MASS INDEX: 27.95 KG/M2

## 2019-06-28 DIAGNOSIS — N45.1 EPIDIDYMITIS: Primary | ICD-10-CM

## 2019-06-28 PROCEDURE — 76210000023 HC REC RM PH II 2 TO 2.5 HR: Performed by: UROLOGY

## 2019-06-28 PROCEDURE — 74011000250 HC RX REV CODE- 250

## 2019-06-28 PROCEDURE — 77030003028 HC SUT VCRL J&J -A: Performed by: UROLOGY

## 2019-06-28 PROCEDURE — 77030011265 HC ELECTRD BLD HEX COVD -A: Performed by: UROLOGY

## 2019-06-28 PROCEDURE — 74011000250 HC RX REV CODE- 250: Performed by: UROLOGY

## 2019-06-28 PROCEDURE — 77030002888 HC SUT CHRMC J&J -A: Performed by: UROLOGY

## 2019-06-28 PROCEDURE — 77030008683 HC TU ET CUF COVD -A: Performed by: NURSE ANESTHETIST, CERTIFIED REGISTERED

## 2019-06-28 PROCEDURE — 74011250636 HC RX REV CODE- 250/636

## 2019-06-28 PROCEDURE — 77030032490 HC SLV COMPR SCD KNE COVD -B: Performed by: UROLOGY

## 2019-06-28 PROCEDURE — C1781 MESH (IMPLANTABLE): HCPCS | Performed by: UROLOGY

## 2019-06-28 PROCEDURE — 77030018836 HC SOL IRR NACL ICUM -A: Performed by: UROLOGY

## 2019-06-28 PROCEDURE — 76210000006 HC OR PH I REC 0.5 TO 1 HR: Performed by: UROLOGY

## 2019-06-28 PROCEDURE — 77030020782 HC GWN BAIR PAWS FLX 3M -B: Performed by: UROLOGY

## 2019-06-28 PROCEDURE — 74011250636 HC RX REV CODE- 250/636: Performed by: NURSE ANESTHETIST, CERTIFIED REGISTERED

## 2019-06-28 PROCEDURE — 76060000035 HC ANESTHESIA 2 TO 2.5 HR: Performed by: UROLOGY

## 2019-06-28 PROCEDURE — 77030002933 HC SUT MCRYL J&J -A: Performed by: UROLOGY

## 2019-06-28 PROCEDURE — 51798 US URINE CAPACITY MEASURE: CPT

## 2019-06-28 PROCEDURE — 77030031139 HC SUT VCRL2 J&J -A: Performed by: UROLOGY

## 2019-06-28 PROCEDURE — 76010000171 HC OR TIME 2 TO 2.5 HR INTENSV-TIER 1: Performed by: UROLOGY

## 2019-06-28 PROCEDURE — 77030019605: Performed by: UROLOGY

## 2019-06-28 PROCEDURE — 74011250636 HC RX REV CODE- 250/636: Performed by: UROLOGY

## 2019-06-28 PROCEDURE — 74011250637 HC RX REV CODE- 250/637

## 2019-06-28 PROCEDURE — 77030012422 HC DRN WND COVD -A: Performed by: UROLOGY

## 2019-06-28 PROCEDURE — 77030018548 HC SUT ETHBND2 J&J -B: Performed by: UROLOGY

## 2019-06-28 PROCEDURE — 77030032020 HC SUPP SCROT 3M -A: Performed by: UROLOGY

## 2019-06-28 DEVICE — IMPLANTABLE DEVICE: Type: IMPLANTABLE DEVICE | Site: INGUINAL | Status: FUNCTIONAL

## 2019-06-28 RX ORDER — LIDOCAINE HYDROCHLORIDE 10 MG/ML
0.1 INJECTION, SOLUTION EPIDURAL; INFILTRATION; INTRACAUDAL; PERINEURAL AS NEEDED
Status: DISCONTINUED | OUTPATIENT
Start: 2019-06-28 | End: 2019-06-28 | Stop reason: HOSPADM

## 2019-06-28 RX ORDER — CEFAZOLIN SODIUM 2 G/50ML
2 SOLUTION INTRAVENOUS ONCE
Status: COMPLETED | OUTPATIENT
Start: 2019-06-28 | End: 2019-06-28

## 2019-06-28 RX ORDER — ONDANSETRON 2 MG/ML
INJECTION INTRAMUSCULAR; INTRAVENOUS AS NEEDED
Status: DISCONTINUED | OUTPATIENT
Start: 2019-06-28 | End: 2019-06-28 | Stop reason: HOSPADM

## 2019-06-28 RX ORDER — OXYCODONE AND ACETAMINOPHEN 5; 325 MG/1; MG/1
1 TABLET ORAL
Status: COMPLETED | OUTPATIENT
Start: 2019-06-28 | End: 2019-06-28

## 2019-06-28 RX ORDER — MIDAZOLAM HYDROCHLORIDE 1 MG/ML
INJECTION, SOLUTION INTRAMUSCULAR; INTRAVENOUS AS NEEDED
Status: DISCONTINUED | OUTPATIENT
Start: 2019-06-28 | End: 2019-06-28 | Stop reason: HOSPADM

## 2019-06-28 RX ORDER — HYDROMORPHONE HYDROCHLORIDE 2 MG/ML
INJECTION, SOLUTION INTRAMUSCULAR; INTRAVENOUS; SUBCUTANEOUS AS NEEDED
Status: DISCONTINUED | OUTPATIENT
Start: 2019-06-28 | End: 2019-06-28 | Stop reason: HOSPADM

## 2019-06-28 RX ORDER — KETOROLAC TROMETHAMINE 30 MG/ML
INJECTION, SOLUTION INTRAMUSCULAR; INTRAVENOUS AS NEEDED
Status: DISCONTINUED | OUTPATIENT
Start: 2019-06-28 | End: 2019-06-28 | Stop reason: HOSPADM

## 2019-06-28 RX ORDER — SODIUM CHLORIDE 9 MG/ML
100 INJECTION, SOLUTION INTRAVENOUS CONTINUOUS
Status: DISCONTINUED | OUTPATIENT
Start: 2019-06-28 | End: 2019-06-28 | Stop reason: HOSPADM

## 2019-06-28 RX ORDER — NALOXONE HYDROCHLORIDE 0.4 MG/ML
0.04 INJECTION, SOLUTION INTRAMUSCULAR; INTRAVENOUS; SUBCUTANEOUS AS NEEDED
Status: DISCONTINUED | OUTPATIENT
Start: 2019-06-28 | End: 2019-06-29 | Stop reason: HOSPADM

## 2019-06-28 RX ORDER — BUPIVACAINE HYDROCHLORIDE 2.5 MG/ML
INJECTION, SOLUTION EPIDURAL; INFILTRATION; INTRACAUDAL AS NEEDED
Status: DISCONTINUED | OUTPATIENT
Start: 2019-06-28 | End: 2019-06-28 | Stop reason: HOSPADM

## 2019-06-28 RX ORDER — SODIUM CHLORIDE, SODIUM LACTATE, POTASSIUM CHLORIDE, CALCIUM CHLORIDE 600; 310; 30; 20 MG/100ML; MG/100ML; MG/100ML; MG/100ML
50 INJECTION, SOLUTION INTRAVENOUS CONTINUOUS
Status: DISCONTINUED | OUTPATIENT
Start: 2019-06-28 | End: 2019-06-28 | Stop reason: HOSPADM

## 2019-06-28 RX ORDER — NEOSTIGMINE METHYLSULFATE 1 MG/ML
INJECTION, SOLUTION INTRAVENOUS AS NEEDED
Status: DISCONTINUED | OUTPATIENT
Start: 2019-06-28 | End: 2019-06-28 | Stop reason: HOSPADM

## 2019-06-28 RX ORDER — DIPHENHYDRAMINE HYDROCHLORIDE 50 MG/ML
12.5 INJECTION, SOLUTION INTRAMUSCULAR; INTRAVENOUS
Status: DISCONTINUED | OUTPATIENT
Start: 2019-06-28 | End: 2019-06-29 | Stop reason: HOSPADM

## 2019-06-28 RX ORDER — DEXAMETHASONE SODIUM PHOSPHATE 4 MG/ML
INJECTION, SOLUTION INTRA-ARTICULAR; INTRALESIONAL; INTRAMUSCULAR; INTRAVENOUS; SOFT TISSUE AS NEEDED
Status: DISCONTINUED | OUTPATIENT
Start: 2019-06-28 | End: 2019-06-28 | Stop reason: HOSPADM

## 2019-06-28 RX ORDER — GLYCOPYRROLATE 0.2 MG/ML
INJECTION INTRAMUSCULAR; INTRAVENOUS AS NEEDED
Status: DISCONTINUED | OUTPATIENT
Start: 2019-06-28 | End: 2019-06-28 | Stop reason: HOSPADM

## 2019-06-28 RX ORDER — FAMOTIDINE 20 MG/1
20 TABLET, FILM COATED ORAL ONCE
Status: DISCONTINUED | OUTPATIENT
Start: 2019-06-28 | End: 2019-06-28 | Stop reason: HOSPADM

## 2019-06-28 RX ORDER — SUCCINYLCHOLINE CHLORIDE 20 MG/ML
INJECTION INTRAMUSCULAR; INTRAVENOUS AS NEEDED
Status: DISCONTINUED | OUTPATIENT
Start: 2019-06-28 | End: 2019-06-28 | Stop reason: HOSPADM

## 2019-06-28 RX ORDER — PROPOFOL 10 MG/ML
INJECTION, EMULSION INTRAVENOUS AS NEEDED
Status: DISCONTINUED | OUTPATIENT
Start: 2019-06-28 | End: 2019-06-28 | Stop reason: HOSPADM

## 2019-06-28 RX ORDER — OXYCODONE AND ACETAMINOPHEN 5; 325 MG/1; MG/1
1 TABLET ORAL
Qty: 30 TAB | Refills: 0 | Status: SHIPPED | OUTPATIENT
Start: 2019-06-28 | End: 2019-07-01

## 2019-06-28 RX ORDER — LIDOCAINE HYDROCHLORIDE 20 MG/ML
INJECTION, SOLUTION EPIDURAL; INFILTRATION; INTRACAUDAL; PERINEURAL AS NEEDED
Status: DISCONTINUED | OUTPATIENT
Start: 2019-06-28 | End: 2019-06-28 | Stop reason: HOSPADM

## 2019-06-28 RX ORDER — ROCURONIUM BROMIDE 10 MG/ML
INJECTION, SOLUTION INTRAVENOUS AS NEEDED
Status: DISCONTINUED | OUTPATIENT
Start: 2019-06-28 | End: 2019-06-28 | Stop reason: HOSPADM

## 2019-06-28 RX ORDER — ONDANSETRON 2 MG/ML
4 INJECTION INTRAMUSCULAR; INTRAVENOUS ONCE
Status: DISCONTINUED | OUTPATIENT
Start: 2019-06-28 | End: 2019-06-29 | Stop reason: HOSPADM

## 2019-06-28 RX ORDER — FENTANYL CITRATE 50 UG/ML
INJECTION, SOLUTION INTRAMUSCULAR; INTRAVENOUS AS NEEDED
Status: DISCONTINUED | OUTPATIENT
Start: 2019-06-28 | End: 2019-06-28 | Stop reason: HOSPADM

## 2019-06-28 RX ORDER — ESMOLOL HYDROCHLORIDE 10 MG/ML
INJECTION INTRAVENOUS AS NEEDED
Status: DISCONTINUED | OUTPATIENT
Start: 2019-06-28 | End: 2019-06-28 | Stop reason: HOSPADM

## 2019-06-28 RX ORDER — SODIUM CHLORIDE, SODIUM LACTATE, POTASSIUM CHLORIDE, CALCIUM CHLORIDE 600; 310; 30; 20 MG/100ML; MG/100ML; MG/100ML; MG/100ML
50 INJECTION, SOLUTION INTRAVENOUS CONTINUOUS
Status: DISCONTINUED | OUTPATIENT
Start: 2019-06-28 | End: 2019-06-29 | Stop reason: HOSPADM

## 2019-06-28 RX ORDER — HYDROMORPHONE HYDROCHLORIDE 2 MG/ML
0.5 INJECTION, SOLUTION INTRAMUSCULAR; INTRAVENOUS; SUBCUTANEOUS
Status: DISCONTINUED | OUTPATIENT
Start: 2019-06-28 | End: 2019-06-29 | Stop reason: HOSPADM

## 2019-06-28 RX ORDER — PROMETHAZINE HYDROCHLORIDE 25 MG/ML
12.5 INJECTION, SOLUTION INTRAMUSCULAR; INTRAVENOUS ONCE
Status: COMPLETED | OUTPATIENT
Start: 2019-06-28 | End: 2019-06-28

## 2019-06-28 RX ORDER — ALBUTEROL SULFATE 0.83 MG/ML
2.5 SOLUTION RESPIRATORY (INHALATION) AS NEEDED
Status: DISCONTINUED | OUTPATIENT
Start: 2019-06-28 | End: 2019-06-29 | Stop reason: HOSPADM

## 2019-06-28 RX ADMIN — FENTANYL CITRATE 50 MCG: 50 INJECTION, SOLUTION INTRAMUSCULAR; INTRAVENOUS at 17:28

## 2019-06-28 RX ADMIN — ROCURONIUM BROMIDE 5 MG: 10 INJECTION, SOLUTION INTRAVENOUS at 16:00

## 2019-06-28 RX ADMIN — SUCCINYLCHOLINE CHLORIDE 160 MG: 20 INJECTION INTRAMUSCULAR; INTRAVENOUS at 16:00

## 2019-06-28 RX ADMIN — GLYCOPYRROLATE 0.2 MG: 0.2 INJECTION INTRAMUSCULAR; INTRAVENOUS at 15:53

## 2019-06-28 RX ADMIN — LIDOCAINE HYDROCHLORIDE 100 MG: 20 INJECTION, SOLUTION EPIDURAL; INFILTRATION; INTRACAUDAL; PERINEURAL at 16:00

## 2019-06-28 RX ADMIN — MIDAZOLAM HYDROCHLORIDE 2 MG: 1 INJECTION, SOLUTION INTRAMUSCULAR; INTRAVENOUS at 15:55

## 2019-06-28 RX ADMIN — GLYCOPYRROLATE 0.6 MG: 0.2 INJECTION INTRAMUSCULAR; INTRAVENOUS at 17:54

## 2019-06-28 RX ADMIN — PROMETHAZINE HYDROCHLORIDE 12.5 MG: 25 INJECTION INTRAMUSCULAR; INTRAVENOUS at 18:38

## 2019-06-28 RX ADMIN — ROCURONIUM BROMIDE 10 MG: 10 INJECTION, SOLUTION INTRAVENOUS at 17:15

## 2019-06-28 RX ADMIN — DEXAMETHASONE SODIUM PHOSPHATE 4 MG: 4 INJECTION, SOLUTION INTRA-ARTICULAR; INTRALESIONAL; INTRAMUSCULAR; INTRAVENOUS; SOFT TISSUE at 16:00

## 2019-06-28 RX ADMIN — PROPOFOL 200 MG: 10 INJECTION, EMULSION INTRAVENOUS at 16:00

## 2019-06-28 RX ADMIN — FENTANYL CITRATE 50 MCG: 50 INJECTION, SOLUTION INTRAMUSCULAR; INTRAVENOUS at 17:50

## 2019-06-28 RX ADMIN — FENTANYL CITRATE 150 MCG: 50 INJECTION, SOLUTION INTRAMUSCULAR; INTRAVENOUS at 16:00

## 2019-06-28 RX ADMIN — KETOROLAC TROMETHAMINE 30 MG: 30 INJECTION, SOLUTION INTRAMUSCULAR; INTRAVENOUS at 17:54

## 2019-06-28 RX ADMIN — FENTANYL CITRATE 100 MCG: 50 INJECTION, SOLUTION INTRAMUSCULAR; INTRAVENOUS at 16:53

## 2019-06-28 RX ADMIN — NEOSTIGMINE METHYLSULFATE 5 MG: 1 INJECTION, SOLUTION INTRAVENOUS at 17:54

## 2019-06-28 RX ADMIN — SODIUM CHLORIDE, SODIUM LACTATE, POTASSIUM CHLORIDE, AND CALCIUM CHLORIDE 50 ML/HR: 600; 310; 30; 20 INJECTION, SOLUTION INTRAVENOUS at 12:21

## 2019-06-28 RX ADMIN — FENTANYL CITRATE 100 MCG: 50 INJECTION, SOLUTION INTRAMUSCULAR; INTRAVENOUS at 16:03

## 2019-06-28 RX ADMIN — OXYCODONE AND ACETAMINOPHEN 1 TABLET: 5; 325 TABLET ORAL at 20:14

## 2019-06-28 RX ADMIN — ROCURONIUM BROMIDE 35 MG: 10 INJECTION, SOLUTION INTRAVENOUS at 16:03

## 2019-06-28 RX ADMIN — MIDAZOLAM HYDROCHLORIDE 2 MG: 1 INJECTION, SOLUTION INTRAMUSCULAR; INTRAVENOUS at 15:53

## 2019-06-28 RX ADMIN — FENTANYL CITRATE 50 MCG: 50 INJECTION, SOLUTION INTRAMUSCULAR; INTRAVENOUS at 17:04

## 2019-06-28 RX ADMIN — CEFAZOLIN 2 G: 10 INJECTION, POWDER, FOR SOLUTION INTRAVENOUS at 15:53

## 2019-06-28 RX ADMIN — ESMOLOL HYDROCHLORIDE 20 MG: 10 INJECTION INTRAVENOUS at 17:25

## 2019-06-28 RX ADMIN — SODIUM CHLORIDE, SODIUM LACTATE, POTASSIUM CHLORIDE, AND CALCIUM CHLORIDE: 600; 310; 30; 20 INJECTION, SOLUTION INTRAVENOUS at 16:30

## 2019-06-28 RX ADMIN — ONDANSETRON 4 MG: 2 INJECTION INTRAMUSCULAR; INTRAVENOUS at 15:53

## 2019-06-28 RX ADMIN — FENTANYL CITRATE 50 MCG: 50 INJECTION, SOLUTION INTRAMUSCULAR; INTRAVENOUS at 17:59

## 2019-06-28 RX ADMIN — HYDROMORPHONE HYDROCHLORIDE 1 MG: 2 INJECTION, SOLUTION INTRAMUSCULAR; INTRAVENOUS; SUBCUTANEOUS at 18:05

## 2019-06-28 RX ADMIN — SODIUM CHLORIDE, SODIUM LACTATE, POTASSIUM CHLORIDE, AND CALCIUM CHLORIDE: 600; 310; 30; 20 INJECTION, SOLUTION INTRAVENOUS at 15:53

## 2019-06-28 NOTE — H&P
Mr. Mar Duron seen today for follow-up communicating hydroceles/inguinal hernias-here today for CT scan imaging results and scheduling of surgical repair        irritative voiding symptoms with history of bladder surgery at age 4 years which sounds like reimplantation of ureters for vesicoureteral reflux  Also has left inguinal hernia and bilateral hydroceles with desire to enlist in the [employer]     symptoms with mild dysuria since July 2018  Patient describes frequent urge to void during the daytime with hesitant intermittent weak urinary stream and mild dysuria symptoms are not persistent, not related to physical activity  No nocturia no hematuria no flank pain patient has decided to enlist in the [employer] for history of what sounds like vesicoureteral reflux with ureteroneocystostomy at age 4-posterior urethral valves treated endoscopically is also a possibility -no records available for review  recently found to have small bilateral hydroceles and ultrasound imaging of the kidney showing no signs of obstruction on either side but increased parenchymal density suggesting renal parenchymal disease-creatinine 1.4  Epididymitis treated with antibiotics 2016-Dr. Collins [facility]     Also has history of recent Inguinal hernia diagnosis        Scrotal ultrasound 29 August 2018   FINDINGS:   Right testicle 4.5 x 2.6 x 3.5 cm. Right epididymal head 1.1 x 0.6 x 0.8 cm. Small right hydrocele. No intratesticular masses      Left testicle 4.5 x 2.2 x 2.9 cm per left epididymal head 0.8 x 0.1 cm. Small  left hydrocele and small left varicocele. Small amount of peristalsis in the  left scrotum suggesting a small left inguinal hernia. No intratesticular masses      Doppler evaluation demonstrate normal spectral waveform analysis of the  testicular arteries and veins bilaterally.     IMPRESSION:   1. Small bilateral hydroceles and small left hydrocele.  Small left scrotal  hernia.   Well-nourished mature male in no apparent distress   Right kidney: 9.5 cm. Left kidney: 9.0 cm. Focal lesions: 0.6 x 0.5 x 0.6 cm interpolar right renal cyst.  Cortical thickness: Preserved. Cortical echogenicity: Increased bilaterally. Mild right pelvocaliectasis. No obstructing lesion  seen.     Bladder: Slight bladder wall thickening without reticulation or diverticula. Prevoid bladder volume = 394 cc. Post void bladder volume = 22 cc. Post void bladder residual = 5%. Bilateral ureteric jets were present during the examination. Prostate measures 2.8 x 3.3 x 4.1 cm, with a calculated volume of 20 cc, within  normal limits.     IMPRESSION:   1. Bilateral echogenic kidneys in keeping with medical renal disease. 2. Mild right pelvocaliectasis without obstructing lesion seen. 3. Tiny right renal cyst. Limited visibility of the kidneys bilaterally/lower  pole, limiting sensitivity for detection of focal lesions.     Review of Systems:   CNS: No seizures syncope headaches dizziness or visual changes  Respiratory: No wheezing shortness of breath chest pain or coughing   cardiovascular: No angina no palpitations no angina no palpitations  Intestinal: No dyspepsia diarrhea or constipation  Urinary: Urgency urinary frequency diminished force of stream no hematuria no hematuria  Skeletal: No bone or joint pain endocrine:  Endocrine: No diabetes or thyroid disease  Other:     CT scan imaging of the abdomen and pelvis on 3 Sosa 2019:  IMPRESSION:  1.  Nonspecific bilateral ureteral dilation with renal pelviectasis.  No  definite obstructing mass or stone.  Vesicoureteral reflux vs. ureteral  reimplantation related chronic change as the consideration for underlying reason  for the ureteral dilation. 2.  No renal mass or stone.   3.  Asymmetry in the size of the kidneys, with the left kidney being much  smaller than the right.   4.  Bilateral inguinal hernia containing fatty tissue only.  No incarceration of  small bowel or colon.  Only the right hydrocele is partially visualized on  current scan. 5.  Incidental nonspecific thickening of the appendix tip.  Perhaps secondary to  retention of internal material.  Attention on followup.        Creatinine 1.4 in August 2018        Allergies: No Known Allergies   Medications:            Past Medical History:   Diagnosis Date    Epididymitis      Scrotal pain              Past Surgical History:   Procedure Laterality Date    HX OTHER SURGICAL         kidney       Social History            Socioeconomic History    Marital status: SINGLE       Spouse name: Not on file    Number of children: Not on file    Years of education: Not on file    Highest education level: Not on file   Occupational History    Occupation: student   Social Needs    Financial resource strain: Not on file    Food insecurity:       Worry: Not on file       Inability: Not on file    Transportation needs:       Medical: Not on file       Non-medical: Not on file   Tobacco Use    Smoking status: Never Smoker    Smokeless tobacco: Never Used   Substance and Sexual Activity    Alcohol use:  No       Alcohol/week: 0.0 oz    Drug use: No    Sexual activity: Yes       Birth control/protection: None   Lifestyle    Physical activity:       Days per week: Not on file       Minutes per session: Not on file    Stress: Not on file   Relationships    Social connections:       Talks on phone: Not on file       Gets together: Not on file       Attends [employer] service: Not on file       Active member of club or organization: Not on file       Attends meetings of clubs or organizations: Not on file       Relationship status: Not on file    Intimate partner violence:       Fear of current or ex partner: Not on file       Emotionally abused: Not on file       Physically abused: Not on file       Forced sexual activity: Not on file   Other Topics Concern    Not on file   Social History Narrative    Not on file            Family History   Problem Relation Age of Onset    Hypertension Mother      Hypertension Father      Diabetes Maternal Grandmother           Physical Examination: Well-nourished mature male in no apparent distress  Neck: No masses no nodes no bruits  Lungs: Clear breath sounds bilaterally no wheezes no rales no rhonchi  Heart: Regular sinus rhythm no murmurs no gallops no rubs  Abdomen: Nontender no palpable masses  External genitalia-penis is normal-bilateral fluctuant nontender hydroceles  Back: No percussion CVA tenderness  Skin: Warm and dry no rash no lesions  Neurologic: No motor or sensory deficits in arms or legs        Urinalysis: +pro/negative nitrite negative heme     Impression: Bilateral hydrocele-hernia                        History of bilateral vesicoureteral reflux with atrophy of left kidney                        Hypertension with mild renal insufficiency-creatinine 1.4           Plan: Bilateral inguinal hydrocelectomy hernia repair at [facility]     Counseling Note:  Technique of bilateral inguinal hydrocelectomy with hernia repair them and described discussed with patient who understands accepts the risk of bleeding, infection, as well as possible recurrence of hernias-small risk of compromised fertility from inadvertent compromise of the vas deferens bilaterally       Mis Navarro MD

## 2019-06-28 NOTE — DISCHARGE INSTRUCTIONS
DISCHARGE SUMMARY from Nurse    PATIENT INSTRUCTIONS:    After general anesthesia or intravenous sedation, for 24 hours or while taking prescription Narcotics:  · Limit your activities  · Do not drive and operate hazardous machinery  · Do not make important personal or business decisions  · Do  not drink alcoholic beverages  · If you have not urinated within 8 hours after discharge, please contact your surgeon on call. Report the following to your surgeon:  · Excessive pain, swelling, redness or odor of or around the surgical area  · Temperature over 100.5  · Nausea and vomiting lasting longer than 4 hours or if unable to take medications  · Any signs of decreased circulation or nerve impairment to extremity: change in color, persistent  numbness, tingling, coldness or increase pain  · Any questions    What to do at Home:  Recommended activity: Activity as tolerated and no driving for today. *  Please give a list of your current medications to your Primary Care Provider. *  Please update this list whenever your medications are discontinued, doses are      changed, or new medications (including over-the-counter products) are added. *  Please carry medication information at all times in case of emergency situations. These are general instructions for a healthy lifestyle:    No smoking/ No tobacco products/ Avoid exposure to second hand smoke  Surgeon General's Warning:  Quitting smoking now greatly reduces serious risk to your health. Obesity, smoking, and sedentary lifestyle greatly increases your risk for illness    A healthy diet, regular physical exercise & weight monitoring are important for maintaining a healthy lifestyle    You may be retaining fluid if you have a history of heart failure or if you experience any of the following symptoms:  Weight gain of 3 pounds or more overnight or 5 pounds in a week, increased swelling in our hands or feet or shortness of breath while lying flat in bed. Please call your doctor as soon as you notice any of these symptoms; do not wait until your next office visit. The discharge information has been reviewed with the patient and parent. The patient and parent verbalized understanding. Discharge medications reviewed with the patient and parent and appropriate educational materials and side effects teaching were provided. Patient Education        Hernia Repair: What to Expect at [address] are likely to have pain for the next few days. You may also feel like you have the flu, and you may have a low fever and feel tired and nauseated. This is common. You should feel better after a few days and will probably feel much better in 7 days. For several weeks you may feel twinges or pulling in the hernia repair when you move. You may have some bruising on the scrotum and along the penis. This is normal. Men will need to wear a jockstrap or briefs, not boxers, for scrotal support for several days after a groin (inguinal) hernia repair. [employer] bicycle shorts may provide good support. This care sheet gives you a general idea about how long it will take for you to recover. But each person recovers at a different pace. Follow the steps below to get better as quickly as possible. How can you care for yourself at home? Activity    · Rest when you feel tired. Getting enough sleep will help you recover.     · Try to walk each day. Start by walking a little more than you did the day before. Bit by bit, increase the amount you walk. Walking boosts blood flow and helps prevent pneumonia and constipation.     · Avoid strenuous activities, such as biking, jogging, weight lifting, or aerobic exercise, until your doctor says it is okay.     · Avoid lifting anything that would make you strain.  This may include heavy grocery bags and milk containers, a heavy briefcase or backpack, cat litter or dog food bags, a vacuum , or a child.     · You may drive when you are no longer taking pain medicine and can quickly move your foot from the gas pedal to the brake. You must also be able to sit comfortably for a long period of time, even if you do not plan to go far. You might get caught in traffic.     · Most people are able to return to work within 1 to 2 weeks after surgery.     · You may shower 24 to 48 hours after surgery, if your doctor okays it. Pat the cut (incision) dry. Do not take a bath for the first 2 weeks, or until your doctor tells you it is okay.     · Your doctor will tell you when you can have sex again. Diet    · You can eat your normal diet. If your stomach is upset, try bland, low-fat foods like plain rice, broiled chicken, toast, and yogurt.     · Drink plenty of fluids (unless your doctor tells you not to).     · You may notice that your bowel movements are not regular right after your surgery. This is common. Avoid constipation and straining with bowel movements. You may want to take a fiber supplement every day. If you have not had a bowel movement after a couple of days, ask your doctor about taking a mild laxative. Medicines    · Your doctor will tell you if and when you can restart your medicines. He or she will also give you instructions about taking any new medicines.     · If you take blood thinners, such as warfarin (Coumadin), clopidogrel (Plavix), or aspirin, be sure to talk to your doctor. He or she will tell you if and when to start taking those medicines again. Make sure that you understand exactly what your doctor wants you to do.     · Be safe with medicines. Take pain medicines exactly as directed. ? If the doctor gave you a prescription medicine for pain, take it as prescribed. ? If you are not taking a prescription pain medicine, take an over-the-counter medicine such as acetaminophen (Tylenol), ibuprofen (Advil, Motrin), or naproxen (Aleve). Read and follow all instructions on the label.   ? Do not take two or more pain medicines at the same time unless the doctor told you to. Many pain medicines have acetaminophen, which is Tylenol. Too much acetaminophen (Tylenol) can be harmful.     · If your doctor prescribed antibiotics, take them as directed. Do not stop taking them just because you feel better. You need to take the full course of antibiotics.     · If you think your pain medicine is making you sick to your stomach:  ? Take your medicine after meals (unless your doctor has told you not to). ? Ask your doctor for a different pain medicine. Incision care    · If you have strips of tape on the cut (incision) the doctor made, leave the tape on for a week or until it falls off.     · If you have staples closing the cut, you will need to visit your doctor in 1 to 2 weeks to have them removed.     · Wash the area daily with warm, soapy water and pat it dry. Follow-up care is a key part of your treatment and safety. Be sure to make and go to all appointments, and call your doctor if you are having problems. It's also a good idea to know your test results and keep a list of the medicines you take. When should you call for help? Call 911 anytime you think you may need emergency care. For example, call if:    · You passed out (lost consciousness).     · You are short of breath.    Call your doctor now or seek immediate medical care if:    · You have pain that does not get better after you take pain medicine.     · You are sick to your stomach and cannot keep fluids down.     · You have signs of a blood clot in your leg (called a deep vein thrombosis), such as:  ? Pain in your calf, back of the knee, thigh, or groin. ? Redness and swelling in your leg or groin.     · You cannot pass stools or gas.     · Bright red blood has soaked through the bandage over your incision.     · You have loose stitches, or your incision comes open.     · You have signs of infection, such as:  ? Increased pain, swelling, warmth, or redness. ?  Red streaks leading from the incision. ? Pus draining from the incision. ? A fever.    Watch closely for any changes in your health, and be sure to contact your doctor if you have any problems. Where can you learn more? Go to http://nilda-darrell.info/. Enter U617 in the search box to learn more about \"Hernia Repair: What to Expect at Home. \"  Current as of: March 27, 2018  Content Version: 11.9  © 2138-6048 [employer]. Care instructions adapted under license by [employer] (which disclaims liability or warranty for this information). If you have questions about a medical condition or this instruction, always ask your healthcare professional. [address] any warranty or liability for your use of this information.        _________

## 2019-06-28 NOTE — ANESTHESIA POSTPROCEDURE EVALUATION
Procedure(s): HERNIA INGUINAL REPAIR BILATERAL. general    Anesthesia Post Evaluation      Multimodal analgesia: multimodal analgesia used between 6 hours prior to anesthesia start to PACU discharge  Patient location during evaluation: PACU  Patient participation: complete - patient participated  Level of consciousness: responsive to verbal stimuli  Pain management: adequate  Airway patency: patent  Anesthetic complications: no  Cardiovascular status: acceptable  Respiratory status: acceptable  Hydration status: acceptable  Post anesthesia nausea and vomiting:  controlled      Vitals Value Taken Time   /71 6/28/2019  6:49 PM   Temp 36.8 °C (98.2 °F) 6/28/2019  6:10 PM   Pulse 78 6/28/2019  6:58 PM   Resp 19 6/28/2019  6:58 PM   SpO2 100 % 6/28/2019  6:58 PM   Vitals shown include unvalidated device data.

## 2019-06-28 NOTE — BRIEF OP NOTE
BRIEF OPERATIVE NOTE    Date of Procedure: 6/28/2019   Preoperative Diagnosis: N43.3 BILATERAL HYDROCELES & HERNIAS  Postoperative Diagnosis: communicating hydroceles/inguinal hernias    Procedure(s):  BILATERAL HYDROCELECTOMIES  HERNIA INGUINAL REPAIR BILATERAL  Surgeon(s) and Role:     Johanna Pearsno MD - Primary         Surgical Assistant:     Surgical Staff:  Circ-1: Warren Menezes  Circ-Relief: Checo Dutotn RN  Scrub Tech-1: Traci LEBLANC  Surg Asst-1: Deborah Lopez  Event Time In Time Out   Incision Start 1626    Incision Close       Anesthesia: General   Estimated Blood Loss: min  Specimens: * No specimens in log *   Findings: bilater hernia and hydrocele   Complications: 0  Implants:   Implant Name Type Inv. Item Serial No.  Lot No. LRB No. Used Action   MESH [employer] PLG SM 2.5X3. 4CM --  - [id number]  MESH ME[employer] PLG SM 2.5X3. 4CM --   BARD EVANS [id number] Left 1 Implanted   M[employer] SM 2.5X3. 4CM --  - [id number]  M[employer] SM 2.5X3. Akbar EVANS [id number] Right 1 Implanted

## 2019-06-28 NOTE — ANESTHESIA PREPROCEDURE EVALUATION
Relevant Problems   No relevant active problems       Anesthetic History   No history of anesthetic complications            Review of Systems / Medical History  Patient summary reviewed and pertinent labs reviewed    Pulmonary  Within defined limits                 Neuro/Psych   Within defined limits           Cardiovascular  Within defined limits                     GI/Hepatic/Renal         Renal disease       Endo/Other  Within defined limits           Other Findings              Physical Exam    Airway  Mallampati: I  TM Distance: > 6 cm  Neck ROM: normal range of motion   Mouth opening: Normal     Cardiovascular  Regular rate and rhythm,  S1 and S2 normal,  no murmur, click, rub, or gallop             Dental  No notable dental hx       Pulmonary  Breath sounds clear to auscultation               Abdominal  GI exam deferred       Other Findings            Anesthetic Plan    ASA: 1  Anesthesia type: general          Induction: Intravenous  Anesthetic plan and risks discussed with: Patient

## 2019-06-29 NOTE — OP NOTES
[facility]  OPERATIVE REPORT    Name:  Jovanna Delacruz  MR#:   [id number]  :  1995  ACCOUNT #:  [de-identified]  DATE OF SERVICE:  2019    PREOPERATIVE DIAGNOSES:  Bilateral inguinal hernias and bilateral hydroceles. POSTOPERATIVE DIAGNOSES:  Bilateral inguinal hernias and bilateral hydroceles. PROCEDURE PERFORMED:  Hydrocelectomy and bilateral hernia repair. SURGEON:  Kellen Heaton MD.    Raven Roach. ANESTHESIA:  General.    COMPLICATIONS:  None. SPECIMENS REMOVED:  None. IMPLANTS:  Plugs and patches bilaterally. ESTIMATED BLOOD LOSS:  Minimal.    INDICATIONS:  The patient is a 57-year-old gentleman with bilateral groin pain, left greater than right, and hydroceles. He is here for hydrocelectomy and bilateral inguinal hernia repair. PROCEDURE:  After appropriate antibiotics and sequential compression stockings, the patient was taken to the operating room, placed in a supine position on the OR table. After adequate general anesthesia, his abdomen was prepped and draped to [facility] standard. Starting with his left side, a standard inguinal herniorrhaphy incision was created and Bovie electrocautery was used to take down the subcutaneous tissue to the external oblique aponeurosis. The aponeurosis was opened sharply with the knife and the Metzenbaum scissors exposing the inguinal canal.  Spermatic cord was bluntly dissected and surrounded with a Penrose drain. A small indirect hernia was noted in the spermatic cord and it was bluntly dissected. It was transected and noted to be a hydrocele inferiorly and a small hernia superiorly. Dr. Pao Grey isolated the hydrocele and tied it off. The hernia sac was then high ligated by myself and allowed to retract back into the preperitoneal space. A small plug was then placed in the internal ring and a patch was used to recreate the floor of the inguinal canal.  Both were sewn in with 0 Ethibond suture.   The patch was sewn to the pubic tubercle, the transversalis fascia, and the shelving portion of the inguinal ligament. Laterally, it was wrapped around the cord and closed with the same suture, tucked underneath the external oblique aponeurosis. Hemostasis was good and the external oblique aponeurosis was closed with 3-0 Vicryl. Copious irrigation was carried out in the subcu and it was closed with 3-0 Vicryl and 4-0 Monocryl. Steri-Strips and sterile dressings were applied. Marcaine 0.25% with epinephrine was used around the wound. He tolerated the procedure well. Attention was then turned to his right side. Same incision was created and inguinal incision was created and Bovie electrocautery was used to take down the subcutaneous tissue to the external oblique aponeurosis. The aponeurosis was opened sharply with a knife and the Metzenbaum scissors exposing the inguinal canal.  Blunt dissection isolated the spermatic cord and it was surrounded with a Penrose drain. A small hernia was noted in the internal ring and a sac was seen in the spermatic cord. It was isolated using combination of sharp and blunt technique and opened. It had no contents, but was noted to be hydrocele inferiorly and the hernia superiorly, so the sac was divided and the hydrocele was closed by Dr. Bruno Lawson. The hernia sac was then high ligated and allowed to retract. The small plug was placed in the internal ring and a patch was used to recreate the floor of the inguinal canal.  Both were sewn in with 0 Ethibond suture. The patch was sewn to the pubic tubercle, the transversalis fascia, and the shelving portion of the inguinal ligament. Laterally, it was wrapped around the cord and closed with the same suture, tucked underneath the external oblique aponeurosis. Copious irrigation was carried out and suctioned free and hemostasis appeared good. The aponeurosis was closed with 3-0 Vicryl.   Irrigation of the wound carried out and the subcutaneous tissue was closed with 3-0 Vicryl and 4-0 Monocryl. Steri-Strips and sterile dressings were applied. Marcaine 0.25% with epinephrine was used around the wound. He tolerated the procedure well, taken to recovery in stable condition.       Ana Rosa Kuhn MD      [id number]  D:  06/28/2019 18:00  T:  06/28/2019 18:11  JOB #:  [id number]

## 2019-06-29 NOTE — PERIOP NOTES
Mother and other family members in to see. Patient assisted to BR from stretcher. Attempted to void unsuccessfully. Provided with apple juice and crackers. Refuses pain medication. 1920 Ambulated to BR. Unable to void. States it hurts. Offered pain med. Patient refused. 2000 Ambulated to BR again. States he feels like the pain would go away if he could urinate. Encouraged to take pain medication as that may help him relax so he can void. Patient agreed to take percocet. 2014 Percocet one tab given. 2020 Ambulated to BR again and attempted to void. But unable. Stated his bladder hurts. 2025 Bladder scanned and shows 550ml. 2030 Dr. Smita Maldonado informed of discomfort and bladder scan results. Order received to insert denise and send patient home with it amd instruct how to remove in AM.  2040 Patient decided he wants to try to void again in a little while before getting the catheter inserted. Voided 100ml. Explained that with 550 ml in his bladder he needs to void more before leaving. 2055 Ambulated to [facility] and tried again. Decided since he can not go and is uncomfortable he will get the catheter. 2105 #16fr denise catheter inserted by Coy Dill RN with prompt return of clear yellow urine. Patient tolerated well. Instructions provided to mother and patient how to care for and empty catheter and how to remove tomorrow.

## 2019-07-17 ENCOUNTER — OFFICE VISIT (OUTPATIENT)
Dept: SURGERY | Age: 24
End: 2019-07-17

## 2019-07-17 VITALS
HEART RATE: 78 BPM | DIASTOLIC BLOOD PRESSURE: 94 MMHG | WEIGHT: 195 LBS | RESPIRATION RATE: 16 BRPM | BODY MASS INDEX: 27.92 KG/M2 | HEIGHT: 70 IN | TEMPERATURE: 98.3 F | SYSTOLIC BLOOD PRESSURE: 155 MMHG

## 2019-07-17 DIAGNOSIS — Z87.19 HISTORY OF BILATERAL INGUINAL HERNIAS: Primary | ICD-10-CM

## 2019-07-17 RX ORDER — CEPHALEXIN 500 MG/1
500 CAPSULE ORAL 4 TIMES DAILY
Qty: 40 CAP | Refills: 40 | Status: SHIPPED | OUTPATIENT
Start: 2019-07-17 | End: 2020-08-02

## 2019-07-17 NOTE — PROGRESS NOTES
Progress Note    Patient: Marisela Roberts  MRN: [id number]  SSN: [id number]   YOB: 1995  Age: 26 y.o. Sex: male     Chief Complaint   Patient presents with    Post OP Follow Up     hernia repair       HPI    Mr. Jerome Meza returns after his bilateral inguinal herniorrhaphy hydrocelectomies. He looks good his wounds are okay he has some very slight erythema in his right herniorrhaphy wound and so I will give him some Keflex for that but he shows no signs of infection. I wonder for this is a reaction to his Steri-Strips. He still pretty tender but his scrotum is of normal size and back next week just to look at his right sided incision. Past Medical History:   Diagnosis Date    Epididymitis     Scrotal pain      Past Surgical History:   Procedure Laterality Date    HX OTHER SURGICAL      kidney     REPAIR ING HERNIA,5+Y/O,REDUCIBL Bilateral 06/28/2019    Dr. Jhonny Marcial     No Known Allergies  Current Outpatient Medications   Medication Sig Dispense Refill    cephALEXin (KEFLEX) 500 mg capsule Take 1 Cap by mouth four (4) times daily.  36 Cap 40     Social History     Socioeconomic History    Marital status: SINGLE     Spouse name: Not on file    Number of children: Not on file    Years of education: Not on file    Highest education level: Not on file   Occupational History    Occupation: student   Social Needs    Financial resource strain: Not on file    Food insecurity:     Worry: Not on file     Inability: Not on file    Transportation needs:     Medical: Not on file     Non-medical: Not on file   Tobacco Use    Smoking status: Never Smoker    Smokeless tobacco: Never Used   Substance and Sexual Activity    Alcohol use: No     Alcohol/week: 0.0 standard drinks    Drug use: No    Sexual activity: Yes     Birth control/protection: None   Lifestyle    Physical activity:     Days per week: Not on file     Minutes per session: Not on file    Stress: Not on file   Relationships    Social connections:     Talks on phone: Not on file     Gets together: Not on file     Attends [employer] service: Not on file     Active member of club or organization: Not on file     Attends meetings of clubs or organizations: Not on file     Relationship status: Not on file    Intimate partner violence:     Fear of current or ex partner: Not on file     Emotionally abused: Not on file     Physically abused: Not on file     Forced sexual activity: Not on file   Other Topics Concern    Not on file   Social History Narrative    Not on file     Family History   Problem Relation Age of Onset    Hypertension Mother     Hypertension Father     Diabetes Maternal Grandmother          Review of systems:  Patient denies any reflux, emesis, abdominal pain, change in bowel habits, hematochezia, melena, fever, weight loss, fatigue chills, dermatitis, abnormal moles, change in vision, vertigo, epistaxis, dysphagia, hoarseness, chest pain, palpitations, hypertension, edema, cough, shortness of breath, wheezing, hemoptysis, snoring, hematuria, diabetes, thyroid disease, anemia, bruising, history of blood transfusion, dizziness, headache, or fainting.     Physical Examination    Well developed well nourished male in no apparent distress  Visit Vitals  BP (!) 155/94   Pulse 78   Temp 98.3 °F (36.8 °C) (Oral)   Resp 16   Ht 5' 10\" (1.778 m)   Wt 88.5 kg (195 lb)   BMI 27.98 kg/m²      Head: normocephalic, atraumatic  Mouth: Clear, no overt lesions, oral mucosa pink and moist  Neck: supple, no masses, no adenopathy or carotid bruits, trachea midline  Resp: clear to auscultation bilaterally, no wheeze, rhonchi or rales, excursions normal and symmetrical  Cardio: Regular rate and rhythm, no murmurs, clicks, gallops or rubs, no edema or varicosities  Abdomen: soft, nontender, nondistended, normoactive bowel sounds, no hernias, no hepatosplenomegaly, bilateral groin incisions with very small seroma under the right side with a little bit of erythema  Back: Deferred  Extremeties: warm, well-perfused, no tenderness or swelling, normal gait/station  Neuro: sensation and strength grossly intact and symmetrical  Psych: alert and oriented to person, place and time  Breast exam deferred    IMPRESSION  Bilateral herniorrhaphies and hydrocelectomy is doing well. Keflex for 1 week    PLAN  Orders Placed This Encounter    cephALEXin (KEFLEX) 500 mg capsule     Sig: Take 1 Cap by mouth four (4) times daily.      Dispense:  40 Cap     Refill:  40     Follow-up next week  Slim Augustine MD

## 2023-02-16 ENCOUNTER — OFFICE VISIT (OUTPATIENT)
Age: 28
End: 2023-02-16
Payer: MEDICAID

## 2023-02-16 VITALS
SYSTOLIC BLOOD PRESSURE: 138 MMHG | DIASTOLIC BLOOD PRESSURE: 88 MMHG | HEART RATE: 66 BPM | OXYGEN SATURATION: 98 % | BODY MASS INDEX: 24.5 KG/M2 | WEIGHT: 175 LBS | HEIGHT: 71 IN

## 2023-02-16 DIAGNOSIS — R00.2 PALPITATIONS: Primary | ICD-10-CM

## 2023-02-16 DIAGNOSIS — R07.9 CHEST PAIN, UNSPECIFIED TYPE: ICD-10-CM

## 2023-02-16 PROCEDURE — 93000 ELECTROCARDIOGRAM COMPLETE: CPT | Performed by: INTERNAL MEDICINE

## 2023-02-16 PROCEDURE — 99204 OFFICE O/P NEW MOD 45 MIN: CPT | Performed by: INTERNAL MEDICINE

## 2023-02-16 RX ORDER — AMOXICILLIN AND CLAVULANATE POTASSIUM 875; 125 MG/1; MG/1
TABLET, FILM COATED ORAL
COMMUNITY
Start: 2023-01-30 | End: 2023-02-16

## 2023-02-16 RX ORDER — LIDOCAINE HYDROCHLORIDE 20 MG/ML
SOLUTION OROPHARYNGEAL
COMMUNITY
Start: 2023-01-23 | End: 2023-02-16

## 2023-02-16 RX ORDER — AZELASTINE 1 MG/ML
SPRAY, METERED NASAL
COMMUNITY
Start: 2023-01-23 | End: 2023-02-16

## 2023-02-16 RX ORDER — POLYMYXIN B SULFATE AND TRIMETHOPRIM 1; 10000 MG/ML; [USP'U]/ML
SOLUTION OPHTHALMIC
COMMUNITY
Start: 2023-01-27 | End: 2023-02-16

## 2023-02-16 RX ORDER — SILICONE ADHESIVE 1.5" X 3"
SHEET (EA) TOPICAL
COMMUNITY
Start: 2023-01-27 | End: 2023-02-16

## 2023-02-16 RX ORDER — LOSARTAN POTASSIUM 25 MG/1
25 TABLET ORAL DAILY
COMMUNITY
Start: 2023-02-13

## 2023-02-16 ASSESSMENT — ANXIETY QUESTIONNAIRES
6. BECOMING EASILY ANNOYED OR IRRITABLE: 0
4. TROUBLE RELAXING: 0
5. BEING SO RESTLESS THAT IT IS HARD TO SIT STILL: 0
7. FEELING AFRAID AS IF SOMETHING AWFUL MIGHT HAPPEN: 0
3. WORRYING TOO MUCH ABOUT DIFFERENT THINGS: 0
IF YOU CHECKED OFF ANY PROBLEMS ON THIS QUESTIONNAIRE, HOW DIFFICULT HAVE THESE PROBLEMS MADE IT FOR YOU TO DO YOUR WORK, TAKE CARE OF THINGS AT HOME, OR GET ALONG WITH OTHER PEOPLE: NOT DIFFICULT AT ALL
1. FEELING NERVOUS, ANXIOUS, OR ON EDGE: 0
2. NOT BEING ABLE TO STOP OR CONTROL WORRYING: 0
GAD7 TOTAL SCORE: 0

## 2023-02-16 ASSESSMENT — PATIENT HEALTH QUESTIONNAIRE - PHQ9
SUM OF ALL RESPONSES TO PHQ QUESTIONS 1-9: 0
SUM OF ALL RESPONSES TO PHQ QUESTIONS 1-9: 0
1. LITTLE INTEREST OR PLEASURE IN DOING THINGS: 0
2. FEELING DOWN, DEPRESSED OR HOPELESS: 0
SUM OF ALL RESPONSES TO PHQ QUESTIONS 1-9: 0
SUM OF ALL RESPONSES TO PHQ QUESTIONS 1-9: 0
SUM OF ALL RESPONSES TO PHQ9 QUESTIONS 1 & 2: 0

## 2023-02-16 NOTE — PROGRESS NOTES
Indira Sanchez    Chief Complaint   Patient presents with    New Patient     New pt referred by self due to palpitations. Chest Pain     Left chest aching constant and worse when laying down. Palpitations     Pounding palps with exertion constant. Shortness of Breath     SOB with exertion that comes and goes. ER follow up for palpitations, chest pain    HPI    Indira Sanchez is a 24 y.o. with CKD and HTN. I obtained and reviewed his ER notes. Says did get a PCP in [place] but cant remember their name. He was given an appt with another Cardiologist but its 5 months from now, and he could not wait that long. He has been feeling chest pain since around the beginning of the year. No obvious injury or trigger. Says felt like its heart burn or something. Feels it when he lays down, more if he lays on right side- but also can be related to certain positions. Specifically if he hunges his back and brings his chest inward, he can reproduce the pain. He was sick with URI but says the CP has been going on well before that. 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 Past Medical History:   Diagnosis Date    Acute prostatitis     Chronic kidney disease     Epididymitis     Erectile dysfunction     Hypertension     Scrotal pain        Past Surgical History:   Procedure Laterality Date    HERNIA REPAIR      OTHER SURGICAL HISTORY      kidney ureter surgery    REPAIR ING HERNIA,5+Y/O,REDUCIBL Bilateral 06/28/2019    Dr. Carter Diana       Current Outpatient Medications   Medication Sig Dispense Refill    losartan (COZAAR) 25 MG tablet Take 25 mg by mouth daily       No current facility-administered medications for this visit.        No Known Allergies    Social History     Socioeconomic History    Marital status: Single     Spouse name: Not on file    Number of children: Not on file    Years of education: Not on file    Highest education level: Not on file   Occupational History    Not on file   Tobacco Use    Smoking status: Never    Smokeless tobacco: Never   Substance and Sexual Activity    Alcohol use: No     Alcohol/week: 0.0 standard drinks    Drug use: No    Sexual activity: Not on file   Other Topics Concern    Not on file   Social History Narrative    Not on file     Social Determinants of Health     Financial Resource Strain: Not on file   Food Insecurity: Not on file   Transportation Needs: Not on file   Physical Activity: Not on file   Stress: Not on file   Social Connections: Not on file   Intimate Partner Violence: Not on file   Housing Stability: Not on file    Mother is my pt    FH: neg premature ASCVD, no SCD    Review of Systems    14 pt Review of Systems is negative unless otherwise mentioned in the HPI. Wt Readings from Last 3 Encounters:   02/16/23 175 lb (79.4 kg)   08/12/21 170 lb (77.1 kg)     Temp Readings from Last 3 Encounters:   No data found for Temp     BP Readings from Last 3 Encounters:   02/16/23 138/88     Pulse Readings from Last 3 Encounters:   02/16/23 66         Physical Exam:    /88 (Site: Left Upper Arm, Position: Sitting, Cuff Size: Small Adult)   Pulse 66   Ht 5' 11\" (1.803 m)   Wt 175 lb (79.4 kg)   SpO2 98%   BMI 24.41 kg/m²    Physical Exam  Vitals and nursing note reviewed. Constitutional:       General: He is not in acute distress. Appearance: Normal appearance. HENT:      Head: Normocephalic. Nose: Nose normal.      Mouth/Throat:      Mouth: Mucous membranes are moist.   Eyes:      Extraocular Movements: Extraocular movements intact. Pupils: Pupils are equal, round, and reactive to light. Neck:      Vascular: No carotid bruit. Cardiovascular:      Rate and Rhythm: Normal rate and regular rhythm. Pulses: Normal pulses. Heart sounds: No murmur heard. No friction rub. No gallop. Comments: +TTP  Pulmonary:      Effort: Pulmonary effort is normal.      Breath sounds: Normal breath sounds.  No wheezing or rales. Abdominal:      Palpations: Abdomen is soft. Musculoskeletal:      Cervical back: Neck supple. Right lower leg: No edema. Left lower leg: No edema. Skin:     General: Skin is warm and dry. Neurological:      General: No focal deficit present. Mental Status: He is alert and oriented to person, place, and time. Psychiatric:         Mood and Affect: Mood normal.       EKG NSR, no st abn    Impression and Plan:  Amanda Heart is a 24 y.o. with:    Atypical but persistent CP- MSK etiology/ suspect Costochondritis  Palpitations, sporadic feels pounding or beating hard  CKD  HTN    Stress echo, if low risk, recommend follow up with PCP for noncardiac causes  Doubt pericarditis and due to CKD/ fluctuating Scr would try to avoid NSAIDS   Will call with results, reassurance given    Thank you for allowing me to participate in the care of your patient, please do not hesitate to call with questions or concerns. No follow-up provider specified.     Mina Luo, DO, DO

## 2023-03-03 ENCOUNTER — TELEPHONE (OUTPATIENT)
Age: 28
End: 2023-03-03

## 2023-03-03 NOTE — TELEPHONE ENCOUNTER
----- Message from Fercho Noriega DO sent at 3/1/2023  8:36 PM EST -----  His stress test was low risk/ negative for ischemia  I would not recommend further CV testing  He should see his PCP for noncardiac causes  Thanks    ----- Message -----  From: Maryse Chávez RN  Sent: 2/28/2023   7:17 AM EST  To: Fercho Noriega DO    Per your last note \"  Atypical but persistent CP- MSK etiology/ suspect Costochondritis  Palpitations, sporadic feels pounding or beating hard  CKD  HTN     Stress echo, if low risk, recommend follow up with PCP for noncardiac causes  Doubt pericarditis and due to CKD/ fluctuating Scr would try to avoid NSAIDS   Will call with results, reassurance given     Thank you for allowing me to participate in the care of your patient, please do not hesitate to call with questions or concerns

## 2023-04-24 ENCOUNTER — TRANSCRIBE ORDERS (OUTPATIENT)
Facility: HOSPITAL | Age: 28
End: 2023-04-24

## 2023-04-24 DIAGNOSIS — R07.89 ATYPICAL CHEST PAIN: Primary | ICD-10-CM

## 2023-05-10 ENCOUNTER — HOSPITAL ENCOUNTER (OUTPATIENT)
Facility: HOSPITAL | Age: 28
Discharge: HOME | End: 2023-05-13
Payer: MEDICAID

## 2023-05-10 DIAGNOSIS — R07.89 ATYPICAL CHEST PAIN: ICD-10-CM

## 2023-05-10 PROCEDURE — 6370000000 HC RX 637 (ALT 250 FOR IP): Performed by: PHYSICIAN ASSISTANT

## 2023-05-10 PROCEDURE — A4641 RADIOPHARM DX AGENT NOC: HCPCS | Performed by: PHYSICIAN ASSISTANT

## 2023-05-10 PROCEDURE — 6360000004 HC RX CONTRAST MEDICATION: Performed by: PHYSICIAN ASSISTANT

## 2023-05-10 PROCEDURE — 74220 X-RAY XM ESOPHAGUS 1CNTRST: CPT

## 2023-05-10 PROCEDURE — 2500000003 HC RX 250 WO HCPCS: Performed by: PHYSICIAN ASSISTANT

## 2023-05-10 RX ADMIN — BARIUM SULFATE 1 TABLET: 700 TABLET ORAL at 08:40

## 2023-05-10 RX ADMIN — BARIUM SULFATE 30 G: 960 POWDER, FOR SUSPENSION ORAL at 08:40

## 2023-05-10 RX ADMIN — ANTACID/ANTIFLATULENT 1 EACH: 380; 550; 10; 10 GRANULE, EFFERVESCENT ORAL at 08:40

## 2023-05-10 RX ADMIN — BARIUM SULFATE 140 ML: 980 POWDER, FOR SUSPENSION ORAL at 08:40

## (undated) DEVICE — NEEDLE HYPO 30GA L0.5IN BGE POLYPR HUB S STL REG BVL STR

## (undated) DEVICE — KENDALL SCD EXPRESS SLEEVES, KNEE LENGTH, MEDIUM: Brand: KENDALL SCD

## (undated) DEVICE — SUPPORT SCROT L KNIT COT SUSP COMFORTABLE SFT LT ADJ E

## (undated) DEVICE — GAUZE SPONGES,8 PLY: Brand: CURITY

## (undated) DEVICE — INTENDED FOR TISSUE SEPARATION, AND OTHER PROCEDURES THAT REQUIRE A SHARP SURGICAL BLADE TO PUNCTURE OR CUT.: Brand: BARD-PARKER SAFETY BLADES SIZE 10, STERILE

## (undated) DEVICE — SOFT SILICONE HYDROCELLULAR SACRUM DRESSING WITH LOCK AWAY LAYER: Brand: ALLEVYN LIFE SACRUM (LARGE) PACK OF 10

## (undated) DEVICE — SUTURE VCRL SZ 3-0 L27IN ABSRB UD L26MM SH 1/2 CIR J416H

## (undated) DEVICE — HEX-LOCKING BLADE ELECTRODE: Brand: EDGE

## (undated) DEVICE — DRAPE TWL SURG 16X26IN BLU ORB04] ALLCARE INC]

## (undated) DEVICE — SHEET, T, LAPAROTOMY, STERILE: Brand: MEDLINE

## (undated) DEVICE — AIRLIFE™ ADULT CUSHION NASAL CANNULA 14 FOOT (4.3) CRUSH-RESISTANT OXYGEN TUBING, AND U/CONNECT-IT ADAPTER: Brand: AIRLIFE™

## (undated) DEVICE — KIT CLN UP BON SECOURS MARYV

## (undated) DEVICE — PACK PROCEDURE SURG MAJ W/ BASIN LF

## (undated) DEVICE — INTENDED FOR TISSUE SEPARATION, AND OTHER PROCEDURES THAT REQUIRE A SHARP SURGICAL BLADE TO PUNCTURE OR CUT.: Brand: BARD-PARKER SAFETY BLADES SIZE 15, STERILE

## (undated) DEVICE — TAPE ADH CLTH SILK H2O REPELLENT CURAD

## (undated) DEVICE — PENROSE TUBING RADIOPAQUE: Brand: ARGYLE

## (undated) DEVICE — SUTURE MCRYL SZ 4-0 L18IN ABSRB UD L19MM PS-2 3/8 CIR PRIM Y496G

## (undated) DEVICE — LIGHT HANDLE: Brand: DEVON

## (undated) DEVICE — DERMACEA GAUZE ROLL: Brand: DERMACEA

## (undated) DEVICE — SPONGE DISSECT PNUT SM 3/8IN -- 5/PK

## (undated) DEVICE — REM POLYHESIVE ADULT PATIENT RETURN ELECTRODE: Brand: VALLEYLAB

## (undated) DEVICE — SUTURE VCRL SZ 2-0 L12X18IN ABSRB UD POLYGLACTIN 910 BRAID J911T

## (undated) DEVICE — TRAY PREP DRY W/ PREM GLV 2 APPL 6 SPNG 2 UNDPD 1 OVERWRAP

## (undated) DEVICE — SUTURE CHROMIC GUT SZ 2-0 L27IN ABSRB BRN L26MM SH 1/2 CIR G123H

## (undated) DEVICE — STERILE POLYISOPRENE POWDER-FREE SURGICAL GLOVES: Brand: PROTEXIS

## (undated) DEVICE — THREE-QUARTER SHEET: Brand: CONVERTORS

## (undated) DEVICE — GOWN,REINFORCED,POLY,AURORA,XLARGE,STRL: Brand: MEDLINE

## (undated) DEVICE — SOLUTION IV 1000ML 0.9% SOD CHL

## (undated) DEVICE — BLADE ASSEMB CLP HAIR FINE --

## (undated) DEVICE — SUTURE CHROMIC GUT SZ 0 L27IN ABSRB BRN SH L26MM 1/2 CIR G124H

## (undated) DEVICE — 3M(TM) MEDIPORE(TM) +PAD SOFT CLOTH ADHESIVE WOUND DRESSING 3566: Brand: 3M™ MEDIPORE™

## (undated) DEVICE — FLEX ADVANTAGE 3000CC: Brand: FLEX ADVANTAGE

## (undated) DEVICE — SUTURE VCRL SZ 3-0 L18IN ABSRB UD POLYGLACTIN 910 BRAID TIE J910T

## (undated) DEVICE — SUTURE ETHBND EXCEL SZ 0 L18IN NONABSORBABLE GRN L26MM CT-2 CX27D

## (undated) DEVICE — 3M™ BAIR PAWS FLEX™ WARMING GOWN, STANDARD, 20 PER CASE 81003: Brand: BAIR PAWS™

## (undated) DEVICE — GAUZE,SPONGE,4"X4",16PLY,STRL,LF,10/TRAY: Brand: MEDLINE

## (undated) DEVICE — MEDI-VAC NON-CONDUCTIVE SUCTION TUBING: Brand: CARDINAL HEALTH